# Patient Record
Sex: MALE | Race: WHITE | Employment: FULL TIME | URBAN - METROPOLITAN AREA
[De-identification: names, ages, dates, MRNs, and addresses within clinical notes are randomized per-mention and may not be internally consistent; named-entity substitution may affect disease eponyms.]

---

## 2016-12-31 LAB
ALBUMIN SERPL-MCNC: NORMAL G/DL
ALP BLD-CCNC: NORMAL U/L
ALT SERPL-CCNC: NORMAL U/L
ANION GAP SERPL CALCULATED.3IONS-SCNC: NORMAL MMOL/L
AST SERPL-CCNC: NORMAL U/L
BILIRUB SERPL-MCNC: NORMAL MG/DL (ref 0.1–1.4)
BUN BLDV-MCNC: NORMAL MG/DL
CALCIUM SERPL-MCNC: NORMAL MG/DL
CHLORIDE BLD-SCNC: NORMAL MMOL/L
CHOLESTEROL, TOTAL: 241 MG/DL
CHOLESTEROL, TOTAL: NORMAL MG/DL
CHOLESTEROL/HDL RATIO: NORMAL
CHOLESTEROL/HDL RATIO: NORMAL
CO2: NORMAL MMOL/L
CREAT SERPL-MCNC: NORMAL MG/DL
GFR CALCULATED: NORMAL
GLUCOSE BLD-MCNC: NORMAL MG/DL
HDLC SERPL-MCNC: 63 MG/DL (ref 35–70)
HDLC SERPL-MCNC: NORMAL MG/DL (ref 35–70)
LDL CHOLESTEROL CALCULATED: 151 MG/DL (ref 0–160)
LDL CHOLESTEROL CALCULATED: NORMAL MG/DL (ref 0–160)
POTASSIUM SERPL-SCNC: NORMAL MMOL/L
SODIUM BLD-SCNC: NORMAL MMOL/L
TOTAL PROTEIN: NORMAL
TRIGL SERPL-MCNC: 117 MG/DL
TRIGL SERPL-MCNC: NORMAL MG/DL
VLDLC SERPL CALC-MCNC: NORMAL MG/DL
VLDLC SERPL CALC-MCNC: NORMAL MG/DL

## 2018-04-12 ENCOUNTER — OFFICE VISIT (OUTPATIENT)
Dept: NEUROLOGY | Age: 48
End: 2018-04-12
Payer: COMMERCIAL

## 2018-04-12 VITALS
WEIGHT: 147.9 LBS | SYSTOLIC BLOOD PRESSURE: 107 MMHG | HEIGHT: 68 IN | DIASTOLIC BLOOD PRESSURE: 75 MMHG | OXYGEN SATURATION: 97 % | BODY MASS INDEX: 22.42 KG/M2 | HEART RATE: 83 BPM

## 2018-04-12 DIAGNOSIS — G43.009 MIGRAINE WITHOUT AURA AND WITHOUT STATUS MIGRAINOSUS, NOT INTRACTABLE: Primary | ICD-10-CM

## 2018-04-12 PROCEDURE — 99214 OFFICE O/P EST MOD 30 MIN: CPT | Performed by: CLINICAL NURSE SPECIALIST

## 2018-04-12 RX ORDER — BACLOFEN 10 MG/1
10 TABLET ORAL NIGHTLY
Qty: 30 TABLET | Refills: 1 | Status: SHIPPED | OUTPATIENT
Start: 2018-04-12 | End: 2019-05-30

## 2018-04-12 RX ORDER — CEFUROXIME AXETIL 250 MG/1
TABLET ORAL
COMMUNITY
Start: 2018-02-20 | End: 2018-07-18 | Stop reason: SDUPTHER

## 2018-04-12 RX ORDER — SUMATRIPTAN 100 MG/1
TABLET, FILM COATED ORAL
COMMUNITY
Start: 2018-01-26 | End: 2018-04-12

## 2018-04-12 RX ORDER — IBUPROFEN 200 MG
200 TABLET ORAL
COMMUNITY
Start: 2014-05-06 | End: 2018-07-18

## 2018-05-08 ENCOUNTER — TELEPHONE (OUTPATIENT)
Dept: NEUROLOGY | Age: 48
End: 2018-05-08

## 2018-06-27 DIAGNOSIS — G43.009 MIGRAINE WITHOUT AURA AND WITHOUT STATUS MIGRAINOSUS, NOT INTRACTABLE: ICD-10-CM

## 2018-06-27 RX ORDER — TOPIRAMATE 50 MG/1
50 TABLET, FILM COATED ORAL NIGHTLY
Qty: 30 TABLET | Refills: 5 | Status: SHIPPED | OUTPATIENT
Start: 2018-06-27 | End: 2018-11-30

## 2018-07-17 NOTE — PROGRESS NOTES
Cardiac Electrophysiology Office Progress Note    Alexandria Uglade  1970  Date of Service: 7/18/18   PCP: Yonatan Vega DO  Cardiologist: Paul Groves MD  Neurologist: Kelle Banegas MD; Shashi Platt, BLANE  Electrophysiologist: Carolyn Harris DO    SUBJECTIVE: Alexandria Ugalde is a 50 y.o. male who presents back to the office for management of: Vasodepressor syncope and sinus bradycardia. Since his last Christopher Apple states he feels well. He denies angina, palpitations, PND, orthopnea or significant dizziness. Patient Active Problem List    Diagnosis Date Noted    Near syncope 10/13/2016    Bradycardia, sinus 09/16/2016     Overview Note:     A). Patient reports full work-up at [facility] 2014 for SB and hypotension: reportedly normal  B). Holter monitor 2014: reports normal   C). S/P exercise stress test 7/26/16 [facility] (Dr Case Woodson): SB, appropriate HR and BP response to exercise  D). 2D echo 7/26/16: patient reports normal (report pending)        Hypotension 09/16/2016     Overview Note:     A). Patient reports H/O  B). Full work-up [facility] 2014: patient reports normal  C). No syncope      Migraine 02/28/2013     Overview Note:     A). Topamax prn  B).  Follows with Neurology         Past Medical History:   Diagnosis Date    Bradycardia     Headache(784.0)     Hypotension     Tilt table evaluation 10/13/2016    positve results    Vasodepressor syncope        Past Surgical History:   Procedure Laterality Date    ROTATOR CUFF REPAIR         Family History   Problem Relation Age of Onset    High Cholesterol Mother     Diabetes Father     Heart Attack Maternal Uncle        Social History   Substance Use Topics    Smoking status: Never Smoker    Smokeless tobacco: Current User     Types: Snuff, Chew    Alcohol use No       Current Outpatient Prescriptions   Medication Sig Dispense Refill    topiramate (TOPAMAX) 50 MG tablet Take 1 tablet by mouth nightly 30 tablet 5    SUMAtriptan (IMITREX) 6 MG/0.5ML SOLN injection Inject 0.5 mLs into the skin once as needed for Migraine 3 vial 5    midodrine (PROAMATINE) 2.5 MG tablet Take 3 tablets by mouth 3 times daily 270 tablet 5    baclofen (LIORESAL) 10 MG tablet Take 1 tablet by mouth nightly (Patient taking differently: No sig reported) 30 tablet 1     No current facility-administered medications for this visit. No Known Allergies    ROS:   HENT: Negative for epistaxis, difficulty swallowing. Eyes: Negative for blurred vision or double vision. Respiratory: Negative for cough, chest tightness, (-) recurrent shortness of breath. Cardiovascular: Negative for chest pain, palpitations and leg swelling. Gastrointestinal: Negative for abdominal pain, heartburn, or blood in stool. Genitourinary: Negative for hematuria, burning or frequency. Musculoskeletal: Negative for myalgias, stiffness, or swelling. Skin: Negative for rash, pain, or lumps. Neurological: (-) for near syncope, headaches  and dizziness, (-) seizures. Some lightheadedness- improved  Psychiatric/Behavioral: Negative for confusion and agitation. The patient is not nervous/anxious. PHYSICAL EXAM:  Vitals:    07/18/18 0827   BP: 100/68   Pulse: 53   Resp: 16   Weight: 141 lb 3.2 oz (64 kg)   Height: 5' 8\" (1.727 m)     Constitutional: Oriented to person, place, and time. Well-developed and cooperative. Head: Normocephalic and atraumatic. Eyes: Conjunctivae are normal.    Neck: No hepatojugular reflux and no JVD present. Carotid bruit is not present. Cardiovascular: S1 normal, S2 normal and intact distal pulses. A regular bradycardic rhythm present. PMI is not displaced. Pulmonary/Chest: Effort normal and breath sounds normal. No respiratory distress. Abdominal: Soft. Normal appearance and bowel sounds are normal. No tenderness. Musculoskeletal: Normal range of motion of all extremities, no muscle weakness. Neurological: Alert and oriented to person, place, and time.  Gait normal.                         IVC Expiration: 1.3 cm     Doppler Measurements & Calculations      MV Peak E-Wave:    AV Peak Velocity: 1.05 m/s      LVOT Peak Velocity:   0.75 m/s           AV Peak Gradient: 4.38 mmHg     0.91 m/s   MV Peak A-Wave:    AV Mean Velocity: 0.61 m/s      LVOT Mean Velocity:   0.65 m/s           AV Mean Gradient: 1.9 mmHg      0.54 m/s   MV E/A Ratio: 1.15 AV VTI: 20.1 cm                 LVOT Peak Gradient: 3.3   MV Peak Gradient:  AV Area (Continuity):2.62 cm^2  mmHgLVOT Mean Gradient:   2.2 mmHg                                           1.4 mmHg   MV Mean Gradient:  LVOT VTI: 16.8 cm   1.1 mmHg           IVRT: 76.1 msec   MV Mean Velocity:   0.5 m/s            Pulm. Vein A Reversal   MV Deceleration    Duration:110.7 msec   Time: 169.9 msec   Pulm. Vein D Velocity:0.34      PV Peak Velocity: 0.98   MV P1/2t: 94.4     m/sPulm. Vein A Reversal        m/s   msec               Velocity:0.17 m/s               PV Peak Gradient: 3.84   MVA by PHT:2.33    Pulm. Vein S Velocity: 0.43 m/s mmHg   cm^2                                               PV Mean Velocity: 0.57   MV Area                                            m/s   (continuity): 2.8                                  PV Mean Gradient: 1.6   cm^2                                               mmHg   MV E' Septal   Velocity: 0.12 m/s                                 [place] ED Velocity: 1.23   MV E' Lateral                                      m/s   Velocity: 0.11 m/s         ECG 7/18/18: Sinus bradycardia at 53 BPM, RSR', no acute ST changes. I have independently reviewed all of the ECGs as per above. I have reviewed all progress notes & records from the time of the patient's last office visit up to present. Impression:    1. Vasodepressor syncope  - positive tilt table test  10/13/16  - midodrine 7.5 mg TID 1/19/17  - No recurrent syncope     2.  H/O recurrent lightheaded and dizziness  - symptoms consistent with OH/volume depletion  - drinks

## 2018-07-18 ENCOUNTER — OFFICE VISIT (OUTPATIENT)
Dept: NON INVASIVE DIAGNOSTICS | Age: 48
End: 2018-07-18
Payer: COMMERCIAL

## 2018-07-18 VITALS
HEART RATE: 53 BPM | WEIGHT: 141.2 LBS | BODY MASS INDEX: 21.4 KG/M2 | DIASTOLIC BLOOD PRESSURE: 68 MMHG | HEIGHT: 68 IN | RESPIRATION RATE: 16 BRPM | SYSTOLIC BLOOD PRESSURE: 100 MMHG

## 2018-07-18 DIAGNOSIS — R55 NEAR SYNCOPE: ICD-10-CM

## 2018-07-18 DIAGNOSIS — R00.1 BRADYCARDIA, SINUS: Primary | ICD-10-CM

## 2018-07-18 PROCEDURE — 99213 OFFICE O/P EST LOW 20 MIN: CPT | Performed by: INTERNAL MEDICINE

## 2018-07-18 PROCEDURE — 93000 ELECTROCARDIOGRAM COMPLETE: CPT | Performed by: INTERNAL MEDICINE

## 2018-08-02 ENCOUNTER — OFFICE VISIT (OUTPATIENT)
Dept: NEUROLOGY | Age: 48
End: 2018-08-02
Payer: COMMERCIAL

## 2018-08-02 VITALS
HEART RATE: 65 BPM | DIASTOLIC BLOOD PRESSURE: 76 MMHG | TEMPERATURE: 96.9 F | SYSTOLIC BLOOD PRESSURE: 112 MMHG | OXYGEN SATURATION: 98 % | WEIGHT: 142.6 LBS | HEIGHT: 68 IN | BODY MASS INDEX: 21.61 KG/M2

## 2018-08-02 DIAGNOSIS — G43.009 MIGRAINE WITHOUT AURA AND WITHOUT STATUS MIGRAINOSUS, NOT INTRACTABLE: Primary | ICD-10-CM

## 2018-08-02 PROCEDURE — 99214 OFFICE O/P EST MOD 30 MIN: CPT | Performed by: CLINICAL NURSE SPECIALIST

## 2018-11-21 LAB
AVERAGE GLUCOSE: NORMAL
HBA1C MFR BLD: NORMAL %

## 2018-11-30 ENCOUNTER — OFFICE VISIT (OUTPATIENT)
Dept: NEUROLOGY | Age: 48
End: 2018-11-30
Payer: COMMERCIAL

## 2018-11-30 VITALS
RESPIRATION RATE: 14 BRPM | BODY MASS INDEX: 20.46 KG/M2 | OXYGEN SATURATION: 96 % | SYSTOLIC BLOOD PRESSURE: 114 MMHG | WEIGHT: 135 LBS | DIASTOLIC BLOOD PRESSURE: 78 MMHG | HEIGHT: 68 IN | HEART RATE: 87 BPM | TEMPERATURE: 96.7 F

## 2018-11-30 DIAGNOSIS — G43.709 CHRONIC MIGRAINE WITHOUT AURA WITHOUT STATUS MIGRAINOSUS, NOT INTRACTABLE: Primary | ICD-10-CM

## 2018-11-30 PROCEDURE — 99214 OFFICE O/P EST MOD 30 MIN: CPT | Performed by: CLINICAL NURSE SPECIALIST

## 2018-11-30 RX ORDER — FLUTICASONE PROPIONATE 50 MCG
1 SPRAY, SUSPENSION (ML) NASAL PRN
COMMUNITY
Start: 2018-11-15 | End: 2019-05-30

## 2018-11-30 NOTE — PROGRESS NOTES
Kalani Mcginnis is a 46 y.o. right handed male     Long history of migraines   Follows regularly for his 3-4 migraines per month   Typically takes Imitrex SC and does very well    Unfortunately, he has developed increasing shoulder pains which seem to be triggering more migraines    We tried Baclofen but he did not notice any improvement    He will be following with a DC -- Discussed risks associated with cervical manipulation. verbalized an understanding that I do not recommend cervical manipulation and verbalized an understanding of the risks associated with cervical manipulation. He previously noted he was following with cardiology and EP for lightheadedness   Positive tilt reported but no valvular issues   He started Proamitine and has been doing well with stable BPs and no reports of lightheadedness     [facility] also previously discontinued his Topamax in the past for fear this was previously causing his \"dizziness\"   (prior to his tilt table)   This was restarted because his migraine headaches were worsening and titrated last appt    Previously, he was taking 100mg -- now taking 50mg    We were hesitant to try Trokendi because of this    He is not a candidate for Inderal because of his above stated heart/BP issues    We discussed Aimovig and Ajovy     He responds better with SQ Imitrex than he did with oral abortive therapies    Noting 3 per month usage     No new medical or neuro complaints   Still following with cardiology -- no med changes reported     No chest pain   No SOB  No vertigo, lightheadedness or loss of consciousness  No falls, tripping or stumbling  No incontinence of bowels or bladder  No itching or bruising appreciated  No numbness, tingling or focal arm/leg weakness    ROS otherwise negative    Prior to Visit Medications    Medication Sig Taking?  Authorizing Provider   Erenumab-aooe (AIMOVIG) 70 MG/ML SOAJ Inject 70 mg into the skin every 30 days Yes [facility]   baclofen

## 2018-12-06 ENCOUNTER — TELEPHONE (OUTPATIENT)
Dept: NEUROLOGY | Age: 48
End: 2018-12-06

## 2019-01-18 ENCOUNTER — OFFICE VISIT (OUTPATIENT)
Dept: NON INVASIVE DIAGNOSTICS | Age: 49
End: 2019-01-18
Payer: COMMERCIAL

## 2019-01-18 VITALS
RESPIRATION RATE: 16 BRPM | HEIGHT: 68 IN | BODY MASS INDEX: 22.43 KG/M2 | HEART RATE: 64 BPM | SYSTOLIC BLOOD PRESSURE: 100 MMHG | DIASTOLIC BLOOD PRESSURE: 60 MMHG | WEIGHT: 148 LBS

## 2019-01-18 DIAGNOSIS — R55 NEAR SYNCOPE: ICD-10-CM

## 2019-01-18 DIAGNOSIS — R00.1 BRADYCARDIA, SINUS: Primary | ICD-10-CM

## 2019-01-18 PROCEDURE — 99213 OFFICE O/P EST LOW 20 MIN: CPT | Performed by: NURSE PRACTITIONER

## 2019-01-18 PROCEDURE — 93000 ELECTROCARDIOGRAM COMPLETE: CPT | Performed by: INTERNAL MEDICINE

## 2019-01-18 ASSESSMENT — ENCOUNTER SYMPTOMS
SHORTNESS OF BREATH: 0
CHEST TIGHTNESS: 0

## 2019-02-04 DIAGNOSIS — G43.709 CHRONIC MIGRAINE WITHOUT AURA WITHOUT STATUS MIGRAINOSUS, NOT INTRACTABLE: ICD-10-CM

## 2019-03-01 ENCOUNTER — OFFICE VISIT (OUTPATIENT)
Dept: NEUROLOGY | Age: 49
End: 2019-03-01
Payer: COMMERCIAL

## 2019-03-01 VITALS
RESPIRATION RATE: 18 BRPM | TEMPERATURE: 96.9 F | BODY MASS INDEX: 21.98 KG/M2 | WEIGHT: 145 LBS | HEIGHT: 68 IN | DIASTOLIC BLOOD PRESSURE: 75 MMHG | OXYGEN SATURATION: 97 % | SYSTOLIC BLOOD PRESSURE: 118 MMHG | HEART RATE: 87 BPM

## 2019-03-01 DIAGNOSIS — G43.709 CHRONIC MIGRAINE WITHOUT AURA WITHOUT STATUS MIGRAINOSUS, NOT INTRACTABLE: Primary | ICD-10-CM

## 2019-03-01 PROCEDURE — 99214 OFFICE O/P EST MOD 30 MIN: CPT | Performed by: CLINICAL NURSE SPECIALIST

## 2019-04-09 DIAGNOSIS — G43.709 CHRONIC MIGRAINE WITHOUT AURA WITHOUT STATUS MIGRAINOSUS, NOT INTRACTABLE: ICD-10-CM

## 2019-05-30 ENCOUNTER — OFFICE VISIT (OUTPATIENT)
Dept: FAMILY MEDICINE CLINIC | Age: 49
End: 2019-05-30
Payer: COMMERCIAL

## 2019-05-30 VITALS
HEART RATE: 80 BPM | OXYGEN SATURATION: 99 % | WEIGHT: 141.38 LBS | SYSTOLIC BLOOD PRESSURE: 110 MMHG | HEIGHT: 68 IN | BODY MASS INDEX: 21.43 KG/M2 | TEMPERATURE: 98.3 F | DIASTOLIC BLOOD PRESSURE: 70 MMHG

## 2019-05-30 DIAGNOSIS — E78.2 MIXED HYPERLIPIDEMIA: Primary | ICD-10-CM

## 2019-05-30 DIAGNOSIS — J30.1 SEASONAL ALLERGIC RHINITIS DUE TO POLLEN: ICD-10-CM

## 2019-05-30 DIAGNOSIS — G43.101 MIGRAINE WITH AURA AND WITH STATUS MIGRAINOSUS, NOT INTRACTABLE: ICD-10-CM

## 2019-05-30 DIAGNOSIS — Z72.0 TOBACCO ABUSE: ICD-10-CM

## 2019-05-30 DIAGNOSIS — R73.01 IMPAIRED FASTING BLOOD SUGAR: ICD-10-CM

## 2019-05-30 PROCEDURE — 99214 OFFICE O/P EST MOD 30 MIN: CPT | Performed by: FAMILY MEDICINE

## 2019-05-30 RX ORDER — FLUTICASONE PROPIONATE 50 MCG
1 SPRAY, SUSPENSION (ML) NASAL PRN
Qty: 16 G | Refills: 5 | Status: SHIPPED | OUTPATIENT
Start: 2019-05-30 | End: 2020-01-07 | Stop reason: ALTCHOICE

## 2019-05-30 SDOH — HEALTH STABILITY: MENTAL HEALTH: HOW MANY STANDARD DRINKS CONTAINING ALCOHOL DO YOU HAVE ON A TYPICAL DAY?: 1 OR 2

## 2019-05-30 SDOH — HEALTH STABILITY: MENTAL HEALTH: HOW OFTEN DO YOU HAVE A DRINK CONTAINING ALCOHOL?: MONTHLY OR LESS

## 2019-05-30 ASSESSMENT — PATIENT HEALTH QUESTIONNAIRE - PHQ9
SUM OF ALL RESPONSES TO PHQ QUESTIONS 1-9: 0
SUM OF ALL RESPONSES TO PHQ9 QUESTIONS 1 & 2: 0
1. LITTLE INTEREST OR PLEASURE IN DOING THINGS: 0
SUM OF ALL RESPONSES TO PHQ QUESTIONS 1-9: 0
2. FEELING DOWN, DEPRESSED OR HOPELESS: 0

## 2019-05-30 ASSESSMENT — ENCOUNTER SYMPTOMS
WHEEZING: 0
RHINORRHEA: 1
VOMITING: 0
ABDOMINAL PAIN: 0
CONSTIPATION: 0
COUGH: 0
BACK PAIN: 0
NAUSEA: 0
SHORTNESS OF BREATH: 0
DIARRHEA: 0
SORE THROAT: 1
SINUS PAIN: 0
EYE PAIN: 0

## 2019-05-30 NOTE — PROGRESS NOTES
5/30/19    Name: Atilio Santana  QYP:[birth year]   Sex:male   Age:48 y.o. Chief Complaint   Patient presents with    Allergic Rhinitis      chronic    Other     migraine     Patient pressent for follow up, no labs done yet    Still allergy problems but nottaking any meds for it either        Review of Systems   Constitutional: Negative for appetite change, fatigue and fever. HENT: Positive for postnasal drip, rhinorrhea, sneezing and sore throat. Negative for congestion, ear pain, hearing loss and sinus pain. Eyes: Negative for pain. Respiratory: Negative for cough, shortness of breath and wheezing. Cardiovascular: Negative for chest pain and leg swelling. Gastrointestinal: Negative for abdominal pain, constipation, diarrhea, nausea and vomiting. Endocrine: Negative for cold intolerance and heat intolerance. Genitourinary: Negative for difficulty urinating, hematuria, scrotal swelling, testicular pain and urgency. Musculoskeletal: Negative for arthralgias, back pain, joint swelling and myalgias. Skin: Negative for rash and wound. Neurological: Negative for dizziness, syncope and light-headedness. Hematological: Negative for adenopathy. Psychiatric/Behavioral: Negative for confusion and sleep disturbance. The patient is not nervous/anxious.             Current Outpatient Medications:     fluticasone (FLONASE) 50 MCG/ACT nasal spray, 1 spray by Nasal route as needed for Rhinitis or Allergies, Disp: 16 g, Rfl: 5    Erenumab-aooe (AIMOVIG) 70 MG/ML SOAJ, Inject 70 mg into the skin every 30 days, Disp: 1 pen, Rfl: 5    SUMAtriptan (IMITREX) 6 MG/0.5ML SOLN injection, Inject 0.5 mLs into the skin once as needed for Migraine, Disp: 3 vial, Rfl: 5  No Known Allergies   Past Medical History:   Diagnosis Date    Allergic rhinitis     Bradycardia     Headache(784.0)     Hypotension     Migraine with aura and with status migrainosus     Substance abuse (HCC)     chews tobacco 1 can per 2 days  Tilt table evaluation 10/13/2016    positve results    Tobacco abuse     Vasodepressor syncope      Patient Active Problem List    Diagnosis Date Noted    Mixed hyperlipidemia 05/30/2019    Impaired fasting blood sugar 05/30/2019    Tobacco abuse     Migraine with aura and with status migrainosus     Near syncope 10/13/2016    Bradycardia, sinus 09/16/2016    Hypotension 09/16/2016    Migraine 02/28/2013      Past Surgical History:   Procedure Laterality Date    ROTATOR CUFF REPAIR      SHOULDER ARTHROSCOPY Bilateral       Social History     Tobacco History     Smoking Status  Never Smoker    Smokeless Tobacco Use  Current User Smokeless Tobacco Type  Chew, Snuff    Tobacco Comment  1 can /2 days          Alcohol History     Alcohol Use Status  Yes Drinks/Week  0 Standard drinks or equivalent per week Amount  0.0 oz alcohol/wk          Drug Use     Drug Use Status  No          Sexual Activity     Sexually Active  Not Asked                  /70   Pulse 80   Temp 98.3 °F (36.8 °C)   Ht 5' 8\" (1.727 m)   Wt 141 lb 6 oz (64.1 kg)   SpO2 99%   BMI 21.50 kg/m²     EXAM:   Physical Exam   Constitutional: He is oriented to person, place, and time. He appears well-developed and well-nourished. HENT:   Head: Normocephalic and atraumatic. Eyes: Pupils are equal, round, and reactive to light. EOM are normal.   Neck: Normal range of motion. Cardiovascular: Normal rate and regular rhythm. Pulmonary/Chest: Effort normal and breath sounds normal.   Musculoskeletal:   Left knee pain, tore meniscus, seeing ortho andhe is delaying surgery for now   Neurological: He is alert and oriented to person, place, and time. Skin: Skin is warm and dry. Nursing note and vitals reviewed. Sandra Kay was seen today for allergic rhinitis  and other. Diagnoses and all orders for this visit:    Mixed hyperlipidemia  -     CBC Auto Differential; Future  -     Comprehensive Metabolic Panel;  Future  -     Lipid Panel; Future    Tobacco abuse    Migraine with aura and with status migrainosus, not intractable    Impaired fasting blood sugar  -     Comprehensive Metabolic Panel; Future  -     Hemoglobin A1C; Future    Seasonal allergic rhinitis due to pollen  -     fluticasone (FLONASE) 50 MCG/ACT nasal spray; 1 spray by Nasal route as needed for Rhinitis or Allergies    add claritin  [facility]  F/uin 6 months    Seen by:   Octavio Coates DO

## 2019-06-19 ENCOUNTER — TELEPHONE (OUTPATIENT)
Dept: NEUROLOGY | Age: 49
End: 2019-06-19

## 2019-07-16 ASSESSMENT — ENCOUNTER SYMPTOMS
CHEST TIGHTNESS: 0
SHORTNESS OF BREATH: 0

## 2019-07-18 ENCOUNTER — OFFICE VISIT (OUTPATIENT)
Dept: NEUROLOGY | Age: 49
End: 2019-07-18
Payer: COMMERCIAL

## 2019-07-18 VITALS
OXYGEN SATURATION: 97 % | DIASTOLIC BLOOD PRESSURE: 75 MMHG | HEART RATE: 73 BPM | WEIGHT: 141.3 LBS | HEIGHT: 68 IN | SYSTOLIC BLOOD PRESSURE: 112 MMHG | RESPIRATION RATE: 20 BRPM | BODY MASS INDEX: 21.41 KG/M2

## 2019-07-18 DIAGNOSIS — G43.709 CHRONIC MIGRAINE WITHOUT AURA WITHOUT STATUS MIGRAINOSUS, NOT INTRACTABLE: Primary | ICD-10-CM

## 2019-07-18 PROCEDURE — 99214 OFFICE O/P EST MOD 30 MIN: CPT | Performed by: CLINICAL NURSE SPECIALIST

## 2019-07-18 NOTE — PROGRESS NOTES
atraumatic  Neck: no adenopathy, no carotid bruit  Lungs: clear to auscultation bilaterally  Heart: regular rate and rhythm, S1, S2 normal, no murmur, click, rub or gallop  Extremities: no clubbing, no cyanosis or edema  Pulses: 2+ and symmetric  Skin: No rashes or lesions      Grimaces to palpation of right trapezius muscle     Mental Status: Alert, oriented, thought content appropriate    Speech: clear  Language: normal    Cranial Nerves:  I: smell    II: visual acuity     II: visual fields Full   II: pupils SIVA   III,VII: ptosis    III,IV,VI: extraocular muscles  Full ROM   V: mastication Normal   V: facial light touch sensation  Normal   V,VII: corneal reflex  Present   VII: facial muscle function - upper     VII: facial muscle function - lower Normal   VIII: hearing Normal   IX: soft palate elevation  Normal   IX,X: gag reflex Present   XI: trapezius strength  5/5   XI: sternocleidomastoid strength 5/5   XI: neck extension strength  5/5   XII: tongue strength  Normal     Motor:  5/5 throughout  Decreased bulk throughout  Normal tone    Sensory:  LT and PP normal  Vibration normal    Coordination:   FN, FFM and CHUCKY normal    Gait:  antalgic    DTR:   1+ throughout    Laboratory/Radiology:     [facility] at [facility]   None to personally review at this time     Echocardiogram unremarkable   No valve issues reported     Assessment:     Migraines   Markedly approved with Aimovig     Limited medication options d/t previous medical conditions      Previously failed Topamax, Imitrex pill, Axert pill, Relpax pill, Zomig nasal spray, Onzetra nasal spray and muscle relaxants such as Baclofen      He is not a candidate for Inderal or other beta blockers d/t cardiac/BP issues     Not a candidate for Trokendi d/t previous side effects with higher doses of Topamax     Orthostatic hypotension - resolved with ProAmatine    Being monitored by cardiology    Plan:     continue Aimovig and Imitrex prn    RTO in fall     Call with

## 2019-07-23 ENCOUNTER — OFFICE VISIT (OUTPATIENT)
Dept: NON INVASIVE DIAGNOSTICS | Age: 49
End: 2019-07-23
Payer: COMMERCIAL

## 2019-07-23 VITALS
DIASTOLIC BLOOD PRESSURE: 70 MMHG | HEIGHT: 68 IN | WEIGHT: 146 LBS | SYSTOLIC BLOOD PRESSURE: 102 MMHG | HEART RATE: 61 BPM | BODY MASS INDEX: 22.13 KG/M2 | RESPIRATION RATE: 18 BRPM

## 2019-07-23 DIAGNOSIS — R00.1 BRADYCARDIA, SINUS: Primary | ICD-10-CM

## 2019-07-23 PROCEDURE — 93000 ELECTROCARDIOGRAM COMPLETE: CPT | Performed by: INTERNAL MEDICINE

## 2019-07-23 PROCEDURE — 99213 OFFICE O/P EST LOW 20 MIN: CPT | Performed by: INTERNAL MEDICINE

## 2019-11-11 ENCOUNTER — OFFICE VISIT (OUTPATIENT)
Dept: FAMILY MEDICINE CLINIC | Age: 49
End: 2019-11-11
Payer: COMMERCIAL

## 2019-11-11 ENCOUNTER — HOSPITAL ENCOUNTER (OUTPATIENT)
Age: 49
Discharge: HOME | End: 2019-11-13
Payer: COMMERCIAL

## 2019-11-11 VITALS
DIASTOLIC BLOOD PRESSURE: 68 MMHG | RESPIRATION RATE: 16 BRPM | SYSTOLIC BLOOD PRESSURE: 120 MMHG | OXYGEN SATURATION: 98 % | TEMPERATURE: 97.9 F | WEIGHT: 137 LBS | BODY MASS INDEX: 20.83 KG/M2 | HEART RATE: 71 BPM

## 2019-11-11 DIAGNOSIS — K62.5 RECTAL BLEEDING: Primary | ICD-10-CM

## 2019-11-11 DIAGNOSIS — K62.5 RECTAL BLEEDING: ICD-10-CM

## 2019-11-11 LAB
BASOPHILS ABSOLUTE: 0.07 E9/L (ref 0–0.2)
BASOPHILS RELATIVE PERCENT: 1.1 % (ref 0–2)
EOSINOPHILS ABSOLUTE: 0.06 E9/L (ref 0.05–0.5)
EOSINOPHILS RELATIVE PERCENT: 1 % (ref 0–6)
HCT VFR BLD CALC: 45.4 % (ref 37–54)
HEMOGLOBIN: 14.8 G/DL (ref 12.5–16.5)
IMMATURE GRANULOCYTES #: 0.01 E9/L
IMMATURE GRANULOCYTES %: 0.2 % (ref 0–5)
LYMPHOCYTES ABSOLUTE: 1.24 E9/L (ref 1.5–4)
LYMPHOCYTES RELATIVE PERCENT: 20.3 % (ref 20–42)
MCH RBC QN AUTO: 29.4 PG (ref 26–35)
MCHC RBC AUTO-ENTMCNC: 32.6 % (ref 32–34.5)
MCV RBC AUTO: 90.3 FL (ref 80–99.9)
MONOCYTES ABSOLUTE: 0.51 E9/L (ref 0.1–0.95)
MONOCYTES RELATIVE PERCENT: 8.3 % (ref 2–12)
NEUTROPHILS ABSOLUTE: 4.22 E9/L (ref 1.8–7.3)
NEUTROPHILS RELATIVE PERCENT: 69.1 % (ref 43–80)
PDW BLD-RTO: 12.1 FL (ref 11.5–15)
PLATELET # BLD: 252 E9/L (ref 130–450)
PMV BLD AUTO: 11.3 FL (ref 7–12)
RBC # BLD: 5.03 E12/L (ref 3.8–5.8)
WBC # BLD: 6.1 E9/L (ref 4.5–11.5)

## 2019-11-11 PROCEDURE — 85025 COMPLETE CBC W/AUTO DIFF WBC: CPT

## 2019-11-11 PROCEDURE — 99213 OFFICE O/P EST LOW 20 MIN: CPT | Performed by: FAMILY MEDICINE

## 2019-11-11 PROCEDURE — 36415 COLL VENOUS BLD VENIPUNCTURE: CPT

## 2019-11-11 RX ORDER — HYDROCORTISONE ACETATE 25 MG/1
SUPPOSITORY RECTAL
Qty: 5 SUPPOSITORY | Refills: 1 | Status: SHIPPED | OUTPATIENT
Start: 2019-11-11 | End: 2019-11-19 | Stop reason: SDUPTHER

## 2019-11-11 ASSESSMENT — ENCOUNTER SYMPTOMS
RESPIRATORY NEGATIVE: 1
BLOOD IN STOOL: 1
CONSTIPATION: 0
RECTAL PAIN: 0
ANAL BLEEDING: 1
ALLERGIC/IMMUNOLOGIC NEGATIVE: 1

## 2019-11-12 ENCOUNTER — TELEPHONE (OUTPATIENT)
Dept: FAMILY MEDICINE CLINIC | Age: 49
End: 2019-11-12

## 2019-11-19 ENCOUNTER — OFFICE VISIT (OUTPATIENT)
Dept: FAMILY MEDICINE CLINIC | Age: 49
End: 2019-11-19
Payer: COMMERCIAL

## 2019-11-19 VITALS
OXYGEN SATURATION: 97 % | SYSTOLIC BLOOD PRESSURE: 110 MMHG | HEART RATE: 78 BPM | BODY MASS INDEX: 21.14 KG/M2 | WEIGHT: 139.5 LBS | HEIGHT: 68 IN | DIASTOLIC BLOOD PRESSURE: 60 MMHG | TEMPERATURE: 98.4 F

## 2019-11-19 DIAGNOSIS — G43.709 CHRONIC MIGRAINE WITHOUT AURA WITHOUT STATUS MIGRAINOSUS, NOT INTRACTABLE: ICD-10-CM

## 2019-11-19 DIAGNOSIS — Z12.5 ENCOUNTER FOR SCREENING FOR MALIGNANT NEOPLASM OF PROSTATE: ICD-10-CM

## 2019-11-19 DIAGNOSIS — Z00.01 ENCOUNTER FOR WELL ADULT EXAM WITH ABNORMAL FINDINGS: Primary | ICD-10-CM

## 2019-11-19 DIAGNOSIS — E07.9 THYROID DISEASE: ICD-10-CM

## 2019-11-19 DIAGNOSIS — E78.2 MIXED HYPERLIPIDEMIA: ICD-10-CM

## 2019-11-19 DIAGNOSIS — K64.9 HEMORRHOIDS, UNSPECIFIED HEMORRHOID TYPE: ICD-10-CM

## 2019-11-19 DIAGNOSIS — J30.9 ALLERGIC RHINITIS, UNSPECIFIED SEASONALITY, UNSPECIFIED TRIGGER: ICD-10-CM

## 2019-11-19 PROCEDURE — 99396 PREV VISIT EST AGE 40-64: CPT | Performed by: FAMILY MEDICINE

## 2019-11-19 RX ORDER — AZELASTINE 1 MG/ML
1 SPRAY, METERED NASAL 2 TIMES DAILY
Qty: 1 BOTTLE | Refills: 5 | Status: SHIPPED | OUTPATIENT
Start: 2019-11-19 | End: 2020-01-07 | Stop reason: ALTCHOICE

## 2019-11-19 RX ORDER — HYDROCORTISONE ACETATE 25 MG/1
SUPPOSITORY RECTAL
Qty: 30 SUPPOSITORY | Refills: 2 | Status: SHIPPED | OUTPATIENT
Start: 2019-11-19 | End: 2020-01-07 | Stop reason: ALTCHOICE

## 2019-11-19 ASSESSMENT — ENCOUNTER SYMPTOMS
WHEEZING: 0
DIARRHEA: 0
NAUSEA: 0
SHORTNESS OF BREATH: 0
SINUS PAIN: 0
RECTAL PAIN: 1
CHEST TIGHTNESS: 0
RHINORRHEA: 1
BACK PAIN: 0
SORE THROAT: 0
CONSTIPATION: 0
VOMITING: 0
COUGH: 0
ABDOMINAL PAIN: 0
EYE PAIN: 0

## 2019-11-26 ENCOUNTER — OFFICE VISIT (OUTPATIENT)
Dept: NEUROLOGY | Age: 49
End: 2019-11-26
Payer: COMMERCIAL

## 2019-11-26 VITALS
HEIGHT: 68 IN | WEIGHT: 139.8 LBS | SYSTOLIC BLOOD PRESSURE: 104 MMHG | HEART RATE: 74 BPM | DIASTOLIC BLOOD PRESSURE: 80 MMHG | BODY MASS INDEX: 21.19 KG/M2 | RESPIRATION RATE: 20 BRPM

## 2019-11-26 DIAGNOSIS — G43.709 CHRONIC MIGRAINE WITHOUT AURA WITHOUT STATUS MIGRAINOSUS, NOT INTRACTABLE: Primary | ICD-10-CM

## 2019-11-26 DIAGNOSIS — G43.009 MIGRAINE WITHOUT AURA AND WITHOUT STATUS MIGRAINOSUS, NOT INTRACTABLE: ICD-10-CM

## 2019-11-26 PROCEDURE — 99214 OFFICE O/P EST MOD 30 MIN: CPT | Performed by: CLINICAL NURSE SPECIALIST

## 2019-11-26 RX ORDER — SUMATRIPTAN 6 MG/.5ML
6 INJECTION, SOLUTION SUBCUTANEOUS
Qty: 3 VIAL | Refills: 5 | Status: SHIPPED | OUTPATIENT
Start: 2019-11-26 | End: 2020-01-07 | Stop reason: ALTCHOICE

## 2019-12-10 DIAGNOSIS — G43.009 MIGRAINE WITHOUT AURA AND WITHOUT STATUS MIGRAINOSUS, NOT INTRACTABLE: ICD-10-CM

## 2019-12-10 DIAGNOSIS — G43.709 CHRONIC MIGRAINE WITHOUT AURA WITHOUT STATUS MIGRAINOSUS, NOT INTRACTABLE: ICD-10-CM

## 2019-12-11 ENCOUNTER — TELEPHONE (OUTPATIENT)
Dept: NEUROLOGY | Age: 49
End: 2019-12-11

## 2020-01-07 NOTE — PROGRESS NOTES
Alexandra PRE-ADMISSION TESTING INSTRUCTIONS    The Preadmission Testing patient is instructed accordingly using the following criteria (check applicable):    ARRIVAL INSTRUCTIONS:  [x] Parking the day of Surgery is located in the Main Entrance lot. Upon entering the door, make an immediate right to the surgery reception desk    [] [phone] is available Monday through Friday 6 am to 6 pm    [x] Bring photo ID and insurance card    [] Bring in a copy of Living will or Durable Power of  papers. [x] Please be sure to arrange for responsible adult to provide transportation to and from the hospital    [x] Please arrange for responsible adult to be with you for the 24 hour period post procedure due to having anesthesia      GENERAL INSTRUCTIONS:    [x] Nothing by mouth after midnight, including gum, candy, mints or water    [x] You may brush your teeth, but do not swallow any water    [] Take medications as instructed with 1-2 oz of water    [] Stop herbal supplements and vitamins 5 days prior to procedure    [] Follow preop dosing of blood thinners per physician instructions    [] Take 1/2 dose of evening insulin, but no insulin after midnight    [] No oral diabetic medications after midnight    [] If diabetic and have low blood sugar or feel symptomatic, take 1-2oz apple juice only    [] Bring inhalers day of surgery    [] Bring C-PAP/ Bi-Pap day of surgery    [] Bring urine specimen day of surgery    [x] Shower or bath with soap, lather and rinse well, AM of Surgery, no lotion, powders or creams to surgical site    [] Follow bowel prep as instructed per surgeon    [x] No tobacco products within 24 hours of surgery     [x] No alcohol or illegal drug use within 24 hours of surgery.     [x] Jewelry, body piercing's, eyeglasses, contact lenses and dentures are not permitted into surgery (bring cases)      [] Please do not wear any nail polish, make up or hair

## 2020-01-08 RX ORDER — SUMATRIPTAN 6 MG/.5ML
6 INJECTION, SOLUTION SUBCUTANEOUS
Qty: 3 VIAL | Refills: 5 | Status: SHIPPED
Start: 2020-01-08 | End: 2020-07-13 | Stop reason: SDUPTHER

## 2020-01-14 ENCOUNTER — ANESTHESIA EVENT (OUTPATIENT)
Dept: OPERATING ROOM | Age: 50
End: 2020-01-14
Payer: COMMERCIAL

## 2020-01-15 ENCOUNTER — HOSPITAL ENCOUNTER (OUTPATIENT)
Age: 50
Setting detail: OUTPATIENT SURGERY
Discharge: HOME | End: 2020-01-15
Attending: ORTHOPAEDIC SURGERY | Admitting: ORTHOPAEDIC SURGERY
Payer: COMMERCIAL

## 2020-01-15 ENCOUNTER — ANESTHESIA (OUTPATIENT)
Dept: OPERATING ROOM | Age: 50
End: 2020-01-15
Payer: COMMERCIAL

## 2020-01-15 VITALS
BODY MASS INDEX: 21.07 KG/M2 | OXYGEN SATURATION: 98 % | HEART RATE: 60 BPM | TEMPERATURE: 97 F | SYSTOLIC BLOOD PRESSURE: 113 MMHG | RESPIRATION RATE: 14 BRPM | HEIGHT: 68 IN | DIASTOLIC BLOOD PRESSURE: 68 MMHG | WEIGHT: 139 LBS

## 2020-01-15 VITALS
DIASTOLIC BLOOD PRESSURE: 74 MMHG | TEMPERATURE: 95.9 F | OXYGEN SATURATION: 99 % | RESPIRATION RATE: 2 BRPM | SYSTOLIC BLOOD PRESSURE: 112 MMHG

## 2020-01-15 LAB — METER GLUCOSE: 94 MG/DL (ref 74–99)

## 2020-01-15 PROCEDURE — 2580000003 HC RX 258: Performed by: NURSE ANESTHETIST, CERTIFIED REGISTERED

## 2020-01-15 PROCEDURE — 7100000001 HC PACU RECOVERY - ADDTL 15 MIN: Performed by: ORTHOPAEDIC SURGERY

## 2020-01-15 PROCEDURE — 3600000013 HC SURGERY LEVEL 3 ADDTL 15MIN: Performed by: ORTHOPAEDIC SURGERY

## 2020-01-15 PROCEDURE — 2500000003 HC RX 250 WO HCPCS: Performed by: ORTHOPAEDIC SURGERY

## 2020-01-15 PROCEDURE — 3700000000 HC ANESTHESIA ATTENDED CARE: Performed by: ORTHOPAEDIC SURGERY

## 2020-01-15 PROCEDURE — 3700000001 HC ADD 15 MINUTES (ANESTHESIA): Performed by: ORTHOPAEDIC SURGERY

## 2020-01-15 PROCEDURE — 3600000003 HC SURGERY LEVEL 3 BASE: Performed by: ORTHOPAEDIC SURGERY

## 2020-01-15 PROCEDURE — 7100000011 HC PHASE II RECOVERY - ADDTL 15 MIN: Performed by: ORTHOPAEDIC SURGERY

## 2020-01-15 PROCEDURE — 7100000000 HC PACU RECOVERY - FIRST 15 MIN: Performed by: ORTHOPAEDIC SURGERY

## 2020-01-15 PROCEDURE — 6370000000 HC RX 637 (ALT 250 FOR IP)

## 2020-01-15 PROCEDURE — 82962 GLUCOSE BLOOD TEST: CPT

## 2020-01-15 PROCEDURE — 6360000002 HC RX W HCPCS: Performed by: NURSE ANESTHETIST, CERTIFIED REGISTERED

## 2020-01-15 PROCEDURE — 2500000003 HC RX 250 WO HCPCS: Performed by: NURSE ANESTHETIST, CERTIFIED REGISTERED

## 2020-01-15 PROCEDURE — 2709999900 HC NON-CHARGEABLE SUPPLY: Performed by: ORTHOPAEDIC SURGERY

## 2020-01-15 PROCEDURE — 6360000002 HC RX W HCPCS: Performed by: ORTHOPAEDIC SURGERY

## 2020-01-15 PROCEDURE — 7100000010 HC PHASE II RECOVERY - FIRST 15 MIN: Performed by: ORTHOPAEDIC SURGERY

## 2020-01-15 RX ORDER — SODIUM CHLORIDE 0.9 % (FLUSH) 0.9 %
10 SYRINGE (ML) INJECTION PRN
Status: DISCONTINUED | OUTPATIENT
Start: 2020-01-15 | End: 2020-01-15 | Stop reason: HOSPADM

## 2020-01-15 RX ORDER — HYDROCODONE BITARTRATE AND ACETAMINOPHEN 5; 325 MG/1; MG/1
1 TABLET ORAL
Status: COMPLETED | OUTPATIENT
Start: 2020-01-15 | End: 2020-01-15

## 2020-01-15 RX ORDER — PROPOFOL 10 MG/ML
INJECTION, EMULSION INTRAVENOUS PRN
Status: DISCONTINUED | OUTPATIENT
Start: 2020-01-15 | End: 2020-01-15 | Stop reason: SDUPTHER

## 2020-01-15 RX ORDER — FENTANYL CITRATE 50 UG/ML
INJECTION, SOLUTION INTRAMUSCULAR; INTRAVENOUS PRN
Status: DISCONTINUED | OUTPATIENT
Start: 2020-01-15 | End: 2020-01-15 | Stop reason: SDUPTHER

## 2020-01-15 RX ORDER — CEFAZOLIN SODIUM 2 G/50ML
2 SOLUTION INTRAVENOUS
Status: COMPLETED | OUTPATIENT
Start: 2020-01-15 | End: 2020-01-15

## 2020-01-15 RX ORDER — BUPIVACAINE HYDROCHLORIDE 2.5 MG/ML
INJECTION, SOLUTION EPIDURAL; INFILTRATION; INTRACAUDAL PRN
Status: DISCONTINUED | OUTPATIENT
Start: 2020-01-15 | End: 2020-01-15 | Stop reason: ALTCHOICE

## 2020-01-15 RX ORDER — MIDAZOLAM HYDROCHLORIDE 1 MG/ML
INJECTION INTRAMUSCULAR; INTRAVENOUS PRN
Status: DISCONTINUED | OUTPATIENT
Start: 2020-01-15 | End: 2020-01-15 | Stop reason: SDUPTHER

## 2020-01-15 RX ORDER — FENTANYL CITRATE 50 UG/ML
25 INJECTION, SOLUTION INTRAMUSCULAR; INTRAVENOUS EVERY 5 MIN PRN
Status: DISCONTINUED | OUTPATIENT
Start: 2020-01-15 | End: 2020-01-15 | Stop reason: HOSPADM

## 2020-01-15 RX ORDER — SODIUM CHLORIDE 9 MG/ML
INJECTION, SOLUTION INTRAVENOUS CONTINUOUS PRN
Status: DISCONTINUED | OUTPATIENT
Start: 2020-01-15 | End: 2020-01-15 | Stop reason: SDUPTHER

## 2020-01-15 RX ORDER — DEXAMETHASONE SODIUM PHOSPHATE 4 MG/ML
INJECTION, SOLUTION INTRA-ARTICULAR; INTRALESIONAL; INTRAMUSCULAR; INTRAVENOUS; SOFT TISSUE PRN
Status: DISCONTINUED | OUTPATIENT
Start: 2020-01-15 | End: 2020-01-15 | Stop reason: SDUPTHER

## 2020-01-15 RX ORDER — LIDOCAINE HYDROCHLORIDE 20 MG/ML
INJECTION, SOLUTION EPIDURAL; INFILTRATION; INTRACAUDAL; PERINEURAL PRN
Status: DISCONTINUED | OUTPATIENT
Start: 2020-01-15 | End: 2020-01-15 | Stop reason: SDUPTHER

## 2020-01-15 RX ORDER — HYDROCODONE BITARTRATE AND ACETAMINOPHEN 5; 325 MG/1; MG/1
TABLET ORAL
Status: COMPLETED
Start: 2020-01-15 | End: 2020-01-15

## 2020-01-15 RX ORDER — LIDOCAINE HYDROCHLORIDE AND EPINEPHRINE 10; 10 MG/ML; UG/ML
INJECTION, SOLUTION INFILTRATION; PERINEURAL PRN
Status: DISCONTINUED | OUTPATIENT
Start: 2020-01-15 | End: 2020-01-15 | Stop reason: ALTCHOICE

## 2020-01-15 RX ORDER — HYDROCODONE BITARTRATE AND ACETAMINOPHEN 5; 325 MG/1; MG/1
1 TABLET ORAL EVERY 6 HOURS PRN
Qty: 28 TABLET | Refills: 0 | Status: SHIPPED | OUTPATIENT
Start: 2020-01-15 | End: 2020-01-22

## 2020-01-15 RX ORDER — SODIUM CHLORIDE 0.9 % (FLUSH) 0.9 %
10 SYRINGE (ML) INJECTION EVERY 12 HOURS SCHEDULED
Status: DISCONTINUED | OUTPATIENT
Start: 2020-01-15 | End: 2020-01-15 | Stop reason: HOSPADM

## 2020-01-15 RX ORDER — ONDANSETRON 2 MG/ML
INJECTION INTRAMUSCULAR; INTRAVENOUS PRN
Status: DISCONTINUED | OUTPATIENT
Start: 2020-01-15 | End: 2020-01-15 | Stop reason: SDUPTHER

## 2020-01-15 RX ADMIN — HYDROCODONE BITARTRATE AND ACETAMINOPHEN 1 TABLET: 5; 325 TABLET ORAL at 11:58

## 2020-01-15 RX ADMIN — DEXAMETHASONE SODIUM PHOSPHATE 10 MG: 4 INJECTION, SOLUTION INTRAMUSCULAR; INTRAVENOUS at 10:08

## 2020-01-15 RX ADMIN — LIDOCAINE HYDROCHLORIDE 50 MG: 20 INJECTION, SOLUTION EPIDURAL; INFILTRATION; INTRACAUDAL; PERINEURAL at 10:00

## 2020-01-15 RX ADMIN — PROPOFOL 125 MG: 10 INJECTION, EMULSION INTRAVENOUS at 10:00

## 2020-01-15 RX ADMIN — SODIUM CHLORIDE: 9 INJECTION, SOLUTION INTRAVENOUS at 09:56

## 2020-01-15 RX ADMIN — MIDAZOLAM 2 MG: 1 INJECTION INTRAMUSCULAR; INTRAVENOUS at 09:56

## 2020-01-15 RX ADMIN — FENTANYL CITRATE 100 MCG: 50 INJECTION, SOLUTION INTRAMUSCULAR; INTRAVENOUS at 10:00

## 2020-01-15 RX ADMIN — CEFAZOLIN SODIUM 2 G: 2 SOLUTION INTRAVENOUS at 09:56

## 2020-01-15 RX ADMIN — ONDANSETRON HYDROCHLORIDE 4 MG: 2 INJECTION, SOLUTION INTRAMUSCULAR; INTRAVENOUS at 10:08

## 2020-01-15 ASSESSMENT — LIFESTYLE VARIABLES: SMOKING_STATUS: 1

## 2020-01-15 ASSESSMENT — PULMONARY FUNCTION TESTS
PIF_VALUE: 10
PIF_VALUE: 23
PIF_VALUE: 10
PIF_VALUE: 1
PIF_VALUE: 10
PIF_VALUE: 25
PIF_VALUE: 15
PIF_VALUE: 10
PIF_VALUE: 4
PIF_VALUE: 10
PIF_VALUE: 26
PIF_VALUE: 11
PIF_VALUE: 10
PIF_VALUE: 21
PIF_VALUE: 10
PIF_VALUE: 1
PIF_VALUE: 11
PIF_VALUE: 1
PIF_VALUE: 10
PIF_VALUE: 1
PIF_VALUE: 10
PIF_VALUE: 10
PIF_VALUE: 6
PIF_VALUE: 10
PIF_VALUE: 21
PIF_VALUE: 10
PIF_VALUE: 10
PIF_VALUE: 6
PIF_VALUE: 10
PIF_VALUE: 10
PIF_VALUE: 3
PIF_VALUE: 10
PIF_VALUE: 10

## 2020-01-15 ASSESSMENT — PAIN DESCRIPTION - DESCRIPTORS: DESCRIPTORS: ACHING

## 2020-01-15 ASSESSMENT — PAIN DESCRIPTION - LOCATION: LOCATION: KNEE

## 2020-01-15 ASSESSMENT — PAIN - FUNCTIONAL ASSESSMENT: PAIN_FUNCTIONAL_ASSESSMENT: 0-10

## 2020-01-15 ASSESSMENT — PAIN DESCRIPTION - PAIN TYPE: TYPE: SURGICAL PAIN

## 2020-01-15 ASSESSMENT — PAIN SCALES - GENERAL
PAINLEVEL_OUTOF10: 0
PAINLEVEL_OUTOF10: 5
PAINLEVEL_OUTOF10: 5
PAINLEVEL_OUTOF10: 0

## 2020-01-15 ASSESSMENT — PAIN DESCRIPTION - ORIENTATION: ORIENTATION: LEFT

## 2020-01-15 NOTE — PROGRESS NOTES
Discharge instructions gone over with patient, medications reviewed with patient. All questions answered, patient verbalized understanding.

## 2020-01-15 NOTE — INTERVAL H&P NOTE
H&P Update    H&P reviewed. No changes.     Electronically signed by Natalee Florez MD on 1/15/20 at 9:52 AM

## 2020-01-15 NOTE — OP NOTE
the 15 blade through the skin, subcutaneous tissue and through the capsule. The knee was extended while the arthroscopic obturator was placed through the lateral portal. This was carried up underneath the patient patella. The camera was then placed and correct joint placement was confirmed. The joint was filled with sterile normal saline. Inspection of the patella was carried out. The scope was then brought down to the lateral gutter and then into the medial gutter. No loose bodies were identified. It was then brought into the medial compartment. A medial portal was established. There was noted to be a complex tear of the posterior horn and body of the medial meniscus. The was debrided with an upbiter and smoothed out with a shaver. He did have some unstable cartilage that was delaminating. This was debrided with a shaver. At this point, the scope was brought across the central portion of the joint. The ACL was identified. This was noted to be intact. Scope was then moved to the lateral compartment. It was found to be in good condition. The camera was then brought back to the patellofemoral joint. Documentation was made throughout the arthroscopic procedure using the camera. Copious amounts of irrigation were then run through the patients joint, again making sure not to leave behind any loose bodies. The arthroscopic instruments were removed. The arthroscopic portals were closed with 3-0 prolene sutures in a figure of 8 fashion. The portal sites were infiltrated with 0.5 % marcaine. A sterile dressing was then applied. There were no complications and the patient tolerated the procedure well. The patient was taken to the recovery room in stable condition. Postoperative: The patient can be weight bearing as tolerated with crutches. The dressing can be removed in 2 days. A prescription will be given for pain medications and for  mg daily for DVT prophylaxis.   The patient should follow up in the office in 14 days.     Leandro Thomas MD

## 2020-01-15 NOTE — ANESTHESIA PRE PROCEDURE
(Chews tobacco)                           Cardiovascular:          ECG reviewed  Rhythm: regular  Rate: normal  Echocardiogram reviewed  Stress test reviewed             ROS comment: Hx of hypotension and bradycardia. Neuro/Psych:   (+) headaches: migraine headaches,             GI/Hepatic/Renal: Neg GI/Hepatic/Renal ROS            Endo/Other:                      ROS comment: Impaired fasting glucose Abdominal:           Vascular: negative vascular ROS. Anesthesia Plan      general     ASA 2     (Pt agrees to General--IV induction.)  Induction: intravenous. Anesthetic plan and risks discussed with patient. Plan discussed with CRNA.     Attending anesthesiologist reviewed and agrees with Barbara Elizalde M.D.

## 2020-01-15 NOTE — H&P
History and Physical  K. Perry Estrada MD        Chief Complaint     Patient is here to discuss and schedule surgery. Patient rates the pain 4/10. last xray 4/26/19        Pain: medial knee  Symptoms: swelling, instability, night pain  Previous Treatment: cortisone injection, NSAIDS, physical therapy, home exercise program  Additional Comments: Abhilash Manuel returns to the office for follow up. He was recently approved for surgery by [employer]. He has continued to have sharp pain that he localizes to his medial knee. Currently pain is 4/10. Pain is worse with activity, walking, stairs and pain improves with rest. He takes tylenol and motrin as needed for pain. Physical Exam   General Appearance:   Awake, alert, oriented x3  Well developed, well nourished  No acute distress  Eyes appear Normal    Respiratory:       Respiratory effort: non-labored    Cardiovascular:   Edema: absent      Varicosities: absent    Lymphatic/Skin:       Skin Appearance: Normal              Left Knee Exam   Skin Exam:   skin intact  Alignment:   neutral  ROM:   0-125  Tenderness:   medial joint line  Arian:   with pain  Laxity with varus/valgus stress:   <5 degrees  Gait:   antalgic  Comments:   Sensation intact to light touch L1-S1  TA/EHL/GS intact  Palpable DP, W/WP  Calf soft, non tender       Past Medical History - reviewed  No pertinent past medical history.     Family History - reviewed  Diabetes (father, mother, sister, paternal grandmother)  Cancer (father)  Heart disease (maternal grandmother, maternal grandfather)    Surgical History - reviewed  Rotator cuff repair (bilateral)    Social History - reviewed  Hand dominance: right  Occupation:     Risk Factors - reviewed  Patient had a flu shot in the last 12 months? no  The patient is 72 years or older and has had a pneumonia vaccine: n/a  Patient has an implant none  Tobacco status: never smoker  Alcohol use: non drinker  Does patient exercise? yes  How often does patient exercise? daily  In the past 12 months, have you fallen? yes          Current Medications (including meds started today):   SUMATRIPTAN SUCCINATE 50 MG ORAL TABLET (SUMATRIPTAN SUCCINATE) ; Route: ORAL  AIMOVIG 70 MG/ML SUBCUTANEOUS SOLUTION AUTO-INJECTOR ([place] Backbone) ; Route: SUBCUTANEOUS      Current Allergies (reviewed this update):  No known allergies      Vital Signs   Weight: 138.01 lbs. (62.60 kg.)  Height: 68 in.    (172.72 cm.)  Blood Pressure: 101/63 mm Hg  Body Mass Index: 20.98  Body Surface Area: 1.75 m2      Review of Systems   General: Positive for fatigue. Eyes:  Patient denies eye irritation, vision loss - 1 eye, discharge, blurring, vision loss - both eyes. ENT:  Patient denies decreased hearing, difficulty swallowing. Cardiovascular: Positive for shortness of breath with exertion. Respiratory:  Patient denies cough, coughing up blood, chest discomfort, wheezing, shortness of breath. Gastrointestinal: Positive for loss of appetite. Genitourinary:  Patient denies urinary retention, urinary frequency, frequent UTI, urinary urgency, pain. Musculoskeletal: Positive for back pain, joint pain. Skin:  Patient denies dryness, unusual hair distribution, suspicious lesions, psoriasis, changes in color of skin, changes in nail beds, poor wound healing. Neurologic: Positive for headaches. Psychiatric:  Patient denies anxiety, depression. Heme/Lymphatic:  Patient denies abnormal bruising. Allergic/Immunologic:  Patient denies seasonal allergies, persistent infections. Impression   1. Complex tear of medial meniscus, current injury, left knee, initial encounter: Unchanged  2. Sprain of unspecified site of left knee, initial encounter: Unchanged    Plan of Care:   I had a long discussion regarding meniscus tears. Operative and nonoperative treatment was discussed. MRI shows a tear of the medial meniscus.   Nonoperative treatment was failed and pain has become a

## 2020-02-26 ENCOUNTER — TELEPHONE (OUTPATIENT)
Dept: NEUROLOGY | Age: 50
End: 2020-02-26

## 2020-05-11 ENCOUNTER — OFFICE VISIT (OUTPATIENT)
Dept: FAMILY MEDICINE CLINIC | Age: 50
End: 2020-05-11
Payer: COMMERCIAL

## 2020-05-11 VITALS
OXYGEN SATURATION: 98 % | BODY MASS INDEX: 21.44 KG/M2 | HEIGHT: 68 IN | TEMPERATURE: 98.2 F | HEART RATE: 66 BPM | SYSTOLIC BLOOD PRESSURE: 108 MMHG | DIASTOLIC BLOOD PRESSURE: 60 MMHG | WEIGHT: 141.5 LBS

## 2020-05-11 PROCEDURE — 99214 OFFICE O/P EST MOD 30 MIN: CPT | Performed by: FAMILY MEDICINE

## 2020-05-11 ASSESSMENT — ENCOUNTER SYMPTOMS
NAUSEA: 0
SHORTNESS OF BREATH: 0
CHEST TIGHTNESS: 0
ABDOMINAL PAIN: 0
WHEEZING: 0
SORE THROAT: 0
SINUS PAIN: 0
BACK PAIN: 0
EYE PAIN: 0
CONSTIPATION: 0
DIARRHEA: 0
VOMITING: 0
COUGH: 0

## 2020-05-11 ASSESSMENT — PATIENT HEALTH QUESTIONNAIRE - PHQ9
1. LITTLE INTEREST OR PLEASURE IN DOING THINGS: 0
2. FEELING DOWN, DEPRESSED OR HOPELESS: 0
SUM OF ALL RESPONSES TO PHQ QUESTIONS 1-9: 0
SUM OF ALL RESPONSES TO PHQ9 QUESTIONS 1 & 2: 0
SUM OF ALL RESPONSES TO PHQ QUESTIONS 1-9: 0

## 2020-05-11 NOTE — PROGRESS NOTES
20    Name: Mónica Santana  :1970   Sex:male   Age:49 y.o. Chief Complaint   Patient presents with    Migraine    Allergic Rhinitis      Patient presents to office for follow up. He denies any complaints. Patient had left knee arthroscopy done, today was his first day back to work. Patient says that his knee is doing fine so far. He denies any issues currently. He did not get labs done. Patient here for check up  Was to have labs done but has not gotten them done yet    Finally had arthroscopic surgery of left knee  Workers comp injury  He continues to see ortho b/c he thinks he will need a full knee replaement  Done with therapy and dooing a little better but still not full strength as before injury    Hx migraines   Stable  Seeing neurology    Hx hyperlipidemia  Needs labs done    Refuses colonoscopy        Review of Systems   Constitutional: Negative for appetite change, fatigue and fever. HENT: Negative for congestion, ear pain, hearing loss, sinus pain and sore throat. Eyes: Negative for pain. Respiratory: Negative for cough, chest tightness, shortness of breath and wheezing. Cardiovascular: Negative for chest pain and leg swelling. Gastrointestinal: Negative for abdominal pain, constipation, diarrhea, nausea and vomiting. Endocrine: Negative for cold intolerance and heat intolerance. Genitourinary: Negative for difficulty urinating, dysuria, frequency, hematuria, scrotal swelling, testicular pain and urgency. Musculoskeletal: Negative for arthralgias, back pain, gait problem, joint swelling and myalgias. Skin: Negative for rash and wound. Neurological: Negative for dizziness, syncope, light-headedness and headaches. Hematological: Negative for adenopathy. Psychiatric/Behavioral: Negative for confusion, dysphoric mood, self-injury, sleep disturbance and suicidal ideas. The patient is not nervous/anxious.             Current Outpatient Medications:     SUMAtriptan (IMITREX) 6 MG/0.5ML SOLN injection, Inject 0.5 mLs into the skin once as needed for Migraine, Disp: 3 vial, Rfl: 5    Erenumab-aooe (AIMOVIG) 70 MG/ML SOAJ, Inject 70 mg into the skin every 30 days, Disp: 1 pen, Rfl: 5  No Known Allergies   Past Medical History:   Diagnosis Date    Acute medial meniscus tear, left, initial encounter     Allergic rhinitis     Headache(784.0)     Migraine with aura and with status migrainosus     Tobacco abuse      Patient Active Problem List    Diagnosis Date Noted    Allergic rhinitis 11/19/2019    Hemorrhoids 11/19/2019    Mixed hyperlipidemia 05/30/2019    Impaired fasting blood sugar 05/30/2019    Tobacco abuse     Migraine with aura and with status migrainosus     Migraine 02/28/2013      Past Surgical History:   Procedure Laterality Date    COLONOSCOPY      KNEE ARTHROSCOPY Left 1/15/2020    LEFT KNEE ARTHROSCOPY WITH PARTIAL MENISCECTOMY performed by Elysia Barrios MD at [address] [place] [address] ARTHROSCOPY Bilateral     WISDOM TOOTH EXTRACTION        Social History     Tobacco History     Smoking Status  Never Smoker    Smokeless Tobacco Use  Current User Smokeless Tobacco Type  Chew, Snuff    Tobacco Comment  1 can /2 days          Alcohol History     Alcohol Use Status  Not Currently Drinks/Week  0 Standard drinks or equivalent per week Amount  0.0 standard drinks of alcohol/wk          Drug Use     Drug Use Status  No          Sexual Activity     Sexually Active  Not Currently            /60   Pulse 66   Temp 98.2 °F (36.8 °C)   Ht 5' 8\" (1.727 m)   Wt 141 lb 8 oz (64.2 kg)   SpO2 98%   BMI 21.52 kg/m²     EXAM:   Physical Exam  Vitals signs and nursing note reviewed. Constitutional:       Appearance: Normal appearance. He is well-developed. HENT:      Head: Normocephalic and atraumatic.       Right Ear: Tympanic membrane normal.      Left Ear: Tympanic membrane normal.      Nose: Nose normal.      Mouth/Throat:

## 2020-07-01 RX ORDER — ERENUMAB-AOOE 70 MG/ML
70 INJECTION SUBCUTANEOUS
Qty: 1 PEN | Refills: 5 | Status: SHIPPED
Start: 2020-07-01 | End: 2020-08-27 | Stop reason: SDUPTHER

## 2020-07-13 ENCOUNTER — OFFICE VISIT (OUTPATIENT)
Dept: NEUROLOGY | Age: 50
End: 2020-07-13
Payer: COMMERCIAL

## 2020-07-13 VITALS
HEIGHT: 68 IN | BODY MASS INDEX: 21.37 KG/M2 | SYSTOLIC BLOOD PRESSURE: 103 MMHG | TEMPERATURE: 98.4 F | RESPIRATION RATE: 20 BRPM | HEART RATE: 84 BPM | WEIGHT: 141 LBS | OXYGEN SATURATION: 95 % | DIASTOLIC BLOOD PRESSURE: 64 MMHG

## 2020-07-13 PROCEDURE — 99214 OFFICE O/P EST MOD 30 MIN: CPT | Performed by: CLINICAL NURSE SPECIALIST

## 2020-07-13 RX ORDER — SUMATRIPTAN 6 MG/.5ML
6 INJECTION, SOLUTION SUBCUTANEOUS
Qty: 3 VIAL | Refills: 5 | Status: SHIPPED
Start: 2020-07-13 | End: 2021-03-22

## 2020-07-13 NOTE — TELEPHONE ENCOUNTER
Forwarding to LISETTE Salcedo, since Kevyn Davila DNP, is out of office.   Electronically signed by Geovanny Bird on 7/1/20 at 8:31 AM EDT
Fibroid, uterine

## 2020-07-13 NOTE — PROGRESS NOTES
Amilcar Campos is a 44 y.o. right handed male     Long history of migraines   He was following regularly for his 3-4 migraines per month   Typically took Imitrex SC for these spells    [facility] also previously discontinued his Topamax in the past for fear this was previously causing his \"dizziness\"   (prior to his tilt table)   This was restarted because his migraine headaches were worsening and titrated last appt    Previously, he was taking 100mg -- now taking 50mg    We were hesitant to try Trokendi because of this    we started Aimovig and he has done well   States he has not suffered a migraine since its initiation    Now taking 70mg     He responds better with SQ Imitrex than he did with oral abortive therapies    He has only needed a few in last 6 months     No new neuro complaints     Still following with cardiology -- no med changes reported     No chest pain   No SOB  No vertigo, lightheadedness or loss of consciousness  No falls, tripping or stumbling  No incontinence of bowels or bladder  No itching or bruising appreciated    ROS otherwise negative    Prior to Visit Medications    Medication Sig Taking?  Authorizing Provider   SUMAtriptan (IMITREX) 6 MG/0.5ML SOLN injection Inject 0.5 mLs into the skin once as needed for Migraine Yes LISETTE Dumont - CNS   Erenumab-aooe (AIMOVIG) 70 MG/ML SOAJ Inject 70 mg into the skin every 30 days Yes LISETTE Garcia - CNP     Allergies as of 07/13/2020    (No Known Allergies)       Objective:     /64 (Site: Left Upper Arm, Position: Sitting, Cuff Size: Medium Adult)   Pulse 84   Temp 98.4 °F (36.9 °C) (Oral)   Resp 20   Ht 5' 8\" (1.727 m)   Wt 141 lb (64 kg)   SpO2 95%   BMI 21.44 kg/m²   Afebrile      General appearance: alert, appears stated age and cooperative -- thin  Head: Normocephalic, without obvious abnormality, atraumatic  Neck: no adenopathy, no carotid bruit  Extremities: no clubbing, no cyanosis or edema  Pulses: 2+ and

## 2020-08-07 ENCOUNTER — HOSPITAL ENCOUNTER (EMERGENCY)
Age: 50
Discharge: HOME | End: 2020-08-07
Payer: COMMERCIAL

## 2020-08-07 VITALS
OXYGEN SATURATION: 95 % | SYSTOLIC BLOOD PRESSURE: 106 MMHG | HEIGHT: 68 IN | WEIGHT: 135 LBS | TEMPERATURE: 97.7 F | BODY MASS INDEX: 20.46 KG/M2 | RESPIRATION RATE: 18 BRPM | HEART RATE: 52 BPM | DIASTOLIC BLOOD PRESSURE: 73 MMHG

## 2020-08-07 PROCEDURE — 99284 EMERGENCY DEPT VISIT MOD MDM: CPT

## 2020-08-07 PROCEDURE — 6370000000 HC RX 637 (ALT 250 FOR IP): Performed by: PHYSICIAN ASSISTANT

## 2020-08-07 PROCEDURE — 6360000002 HC RX W HCPCS: Performed by: PHYSICIAN ASSISTANT

## 2020-08-07 PROCEDURE — 96372 THER/PROPH/DIAG INJ SC/IM: CPT

## 2020-08-07 RX ORDER — HYDROXYZINE HYDROCHLORIDE 50 MG/ML
25 INJECTION, SOLUTION INTRAMUSCULAR ONCE
Status: COMPLETED | OUTPATIENT
Start: 2020-08-07 | End: 2020-08-07

## 2020-08-07 RX ORDER — FAMOTIDINE 20 MG/1
20 TABLET, FILM COATED ORAL ONCE
Status: COMPLETED | OUTPATIENT
Start: 2020-08-07 | End: 2020-08-07

## 2020-08-07 RX ORDER — DIPHENHYDRAMINE HCL 25 MG
50 TABLET ORAL ONCE
Status: COMPLETED | OUTPATIENT
Start: 2020-08-07 | End: 2020-08-07

## 2020-08-07 RX ORDER — DEXAMETHASONE SODIUM PHOSPHATE 10 MG/ML
8 INJECTION INTRAMUSCULAR; INTRAVENOUS ONCE
Status: COMPLETED | OUTPATIENT
Start: 2020-08-07 | End: 2020-08-07

## 2020-08-07 RX ORDER — PREDNISONE 10 MG/1
40 TABLET ORAL DAILY
Qty: 20 TABLET | Refills: 0 | Status: SHIPPED | OUTPATIENT
Start: 2020-08-07 | End: 2020-08-12

## 2020-08-07 RX ORDER — FAMOTIDINE 20 MG/1
20 TABLET, FILM COATED ORAL 2 TIMES DAILY
Qty: 14 TABLET | Refills: 0 | Status: SHIPPED | OUTPATIENT
Start: 2020-08-07 | End: 2021-03-22

## 2020-08-07 RX ORDER — HYDROCODONE BITARTRATE AND ACETAMINOPHEN 5; 325 MG/1; MG/1
1 TABLET ORAL ONCE
Status: COMPLETED | OUTPATIENT
Start: 2020-08-07 | End: 2020-08-07

## 2020-08-07 RX ORDER — HYDROXYZINE PAMOATE 25 MG/1
25 CAPSULE ORAL 3 TIMES DAILY PRN
Qty: 21 CAPSULE | Refills: 0 | Status: SHIPPED | OUTPATIENT
Start: 2020-08-07 | End: 2020-08-14

## 2020-08-07 RX ADMIN — DEXAMETHASONE SODIUM PHOSPHATE 8 MG: 10 INJECTION INTRAMUSCULAR; INTRAVENOUS at 02:42

## 2020-08-07 RX ADMIN — HYDROXYZINE HYDROCHLORIDE 25 MG: 50 INJECTION, SOLUTION INTRAMUSCULAR at 03:08

## 2020-08-07 RX ADMIN — FAMOTIDINE 20 MG: 20 TABLET ORAL at 02:41

## 2020-08-07 RX ADMIN — DIPHENHYDRAMINE HCL 50 MG: 25 TABLET ORAL at 02:41

## 2020-08-07 RX ADMIN — HYDROCODONE BITARTRATE AND ACETAMINOPHEN 1 TABLET: 5; 325 TABLET ORAL at 03:09

## 2020-08-07 ASSESSMENT — PAIN DESCRIPTION - DESCRIPTORS: DESCRIPTORS: BURNING;OTHER (COMMENT)

## 2020-08-07 ASSESSMENT — PAIN DESCRIPTION - LOCATION: LOCATION: GENERALIZED

## 2020-08-07 ASSESSMENT — PAIN DESCRIPTION - PAIN TYPE: TYPE: ACUTE PAIN

## 2020-08-07 ASSESSMENT — PAIN SCALES - GENERAL
PAINLEVEL_OUTOF10: 10
PAINLEVEL_OUTOF10: 10

## 2020-08-07 ASSESSMENT — PAIN DESCRIPTION - ONSET: ONSET: ON-GOING

## 2020-08-07 NOTE — ED PROVIDER NOTES
------------------------- NURSING NOTES AND VITALS REVIEWED ---------------------------   The nursing notes within the ED encounter and vital signs as below have been reviewed. /87   Pulse 84   Temp 97.7 °F (36.5 °C) (Oral)   Resp 16   Ht 5' 8\" (1.727 m)   Wt 135 lb (61.2 kg)   SpO2 98%   BMI 20.53 kg/m²   Oxygen Saturation Interpretation: Normal      ---------------------------------------------------PHYSICAL EXAM--------------------------------------      Constitutional/General: Alert and oriented x3, well appearing, non toxic in NAD  Head: Normocephalic and atraumatic mild swelling of the right ear noted  Eyes: PERRL, EOMI  Mouth: Oropharynx clear, handling secretions, no trismus  Neck: Supple, full ROM,   Pulmonary: Lungs clear to auscultation bilaterally, no wheezes, rales, or rhonchi. Not in respiratory distress  Cardiovascular:  Regular rate and rhythm, no murmurs, gallops, or rubs. 2+ distal pulses  Abdomen: Soft, non tender, non distended,   Extremities: Moves all extremities x 4. Warm and well perfused erythema of the left arm present. No rash noted. No swelling noted. Skin: warm and dry without rash  Neurologic: GCS 15,  Psych anxious affect      ------------------------------ ED COURSE/MEDICAL DECISION MAKING----------------------  Medications   dexamethasone (DECADRON) injection 8 mg (8 mg Intramuscular Given 8/7/20 0242)   diphenhydrAMINE (BENADRYL) tablet 50 mg (50 mg Oral Given 8/7/20 0241)   famotidine (PEPCID) tablet 20 mg (20 mg Oral Given 8/7/20 0241)   HYDROcodone-acetaminophen (NORCO) 5-325 MG per tablet 1 tablet (1 tablet Oral Given 8/7/20 0309)   hydrOXYzine (VISTARIL) injection 25 mg (25 mg Intramuscular Given 8/7/20 0308)         ED COURSE:   Patient stating now he is having pain radiating down the arm to the hand. He denies any neck pain. Pruritus persistent at the elbow and lower arm.     Medical Decision Making:    Patient came in with complaint of pruritus of the left arm and the pain. He had been stung  By a bee, 2 days previously. No difficulty swallowing shortness of breath no sign of anaphylaxis. Patient had an improvement in his pruritus after Vistaril. States the pain of the arm is more superficial likely secondary to scratching of the skin. He was discharged with prednisone burst Pepcid Vistaril he is to follow-up with his primary care 1 to 2 days. Counseling: The emergency provider has spoken with the patient and discussed todays results, in addition to providing specific details for the plan of care and counseling regarding the diagnosis and prognosis. Questions are answered at this time and they are agreeable with the plan.      --------------------------------- IMPRESSION AND DISPOSITION ---------------------------------    IMPRESSION  1. Insect bite, nonvenomous of face, neck, and scalp except eye, initial encounter    2. Pruritic disorder        DISPOSITION  Disposition: Discharge to home  Patient condition is good      NOTE: This report was transcribed using voice recognition software.  Every effort was made to ensure accuracy; however, inadvertent computerized transcription errors may be present     Mikaela Dunawayma  08/07/20 [id number]

## 2020-08-07 NOTE — ED NOTES
Asking for more meds for itch  [facility] notified     Tino Rodriguez RN  08/07/20 [id number]

## 2020-08-27 RX ORDER — ERENUMAB-AOOE 70 MG/ML
70 INJECTION SUBCUTANEOUS
Qty: 3 PEN | Refills: 3 | Status: SHIPPED
Start: 2020-08-27 | End: 2021-07-07

## 2020-11-05 ENCOUNTER — TELEPHONE (OUTPATIENT)
Dept: NEUROLOGY | Age: 50
End: 2020-11-05

## 2020-11-05 NOTE — TELEPHONE ENCOUNTER
PERRY to set up Jan/Feb in [employer] w/Chris Parker, [address]  Electronically signed by Lian Mooney on 11/5/20 at 2:36 PM EST

## 2020-11-10 ENCOUNTER — TELEPHONE (OUTPATIENT)
Dept: NEUROLOGY | Age: 50
End: 2020-11-10

## 2020-11-10 NOTE — TELEPHONE ENCOUNTER
Letter to patient to set up Jan 2021 w/Chris Cardenas in [facility]  Electronically signed by Ana Luisa Garcia on 11/10/20 at 3:03 PM EST

## 2020-11-23 ENCOUNTER — TELEPHONE (OUTPATIENT)
Dept: NEUROLOGY | Age: 50
End: 2020-11-23

## 2021-01-12 ENCOUNTER — TELEPHONE (OUTPATIENT)
Dept: NEUROLOGY | Age: 51
End: 2021-01-12

## 2021-01-12 NOTE — TELEPHONE ENCOUNTER
Aimovig Approval - #[id number],  Valid 1/11/2021 - 7/10/2021.   In Media  Electronically signed by Manisha Spears on 1/12/21 at 10:19 AM EST

## 2021-03-22 ENCOUNTER — OFFICE VISIT (OUTPATIENT)
Dept: NEUROLOGY | Age: 51
End: 2021-03-22

## 2021-03-22 VITALS
HEART RATE: 72 BPM | BODY MASS INDEX: 20.46 KG/M2 | RESPIRATION RATE: 20 BRPM | HEIGHT: 68 IN | DIASTOLIC BLOOD PRESSURE: 76 MMHG | TEMPERATURE: 98 F | SYSTOLIC BLOOD PRESSURE: 113 MMHG | OXYGEN SATURATION: 96 % | WEIGHT: 135 LBS

## 2021-03-22 DIAGNOSIS — G43.009 MIGRAINE WITHOUT AURA AND WITHOUT STATUS MIGRAINOSUS, NOT INTRACTABLE: ICD-10-CM

## 2021-03-22 DIAGNOSIS — G43.709 CHRONIC MIGRAINE WITHOUT AURA WITHOUT STATUS MIGRAINOSUS, NOT INTRACTABLE: Primary | ICD-10-CM

## 2021-03-22 PROCEDURE — 99214 OFFICE O/P EST MOD 30 MIN: CPT | Performed by: CLINICAL NURSE SPECIALIST

## 2021-03-22 RX ORDER — SUMATRIPTAN 6 MG/.5ML
6 INJECTION, SOLUTION SUBCUTANEOUS
Qty: 3 VIAL | Refills: 5 | Status: SHIPPED
Start: 2021-03-22 | End: 2021-09-13 | Stop reason: SDUPTHER

## 2021-03-22 RX ORDER — SUMATRIPTAN SUCCINATE 6 MG/.5ML
INJECTION, SOLUTION SUBCUTANEOUS
COMMUNITY
Start: 2021-02-28 | End: 2021-09-13

## 2021-03-22 NOTE — PROGRESS NOTES
Hilda Araujo is a 44 y.o. right handed male     Long history of migraines   He was following regularly for his 3-4 migraines per month   Typically took Imitrex SC for these spells    [facility] also previously discontinued his Topamax in the past for fear this was previously causing his \"dizziness\"   (prior to his tilt table)   This was restarted because his migraine headaches were worsening and titrated last appt    Previously, he was taking 100mg -- now taking 50mg    We were hesitant to try Trokendi because of this    we started Aimovig and did well   Now taking 70mg but he did have a 5 month period where his insurance told him \"he used up his allowance\"    He never notified my office but states he got it March 1 and is doing well again     He responds better with SQ Imitrex than he did with oral abortive therapies    Never tried Gabriela Michelle or Nurtec     No new neuro complaints     Still following with cardiology -- no med changes reported     No chest pain   No SOB  No vertigo, lightheadedness or loss of consciousness  No falls, tripping or stumbling  No incontinence of bowels or bladder  No itching or bruising appreciated    ROS otherwise negative    Prior to Visit Medications    Medication Sig Taking?  Authorizing Provider   SUMAtriptan Succinate Refill 6 MG/0.5ML SOCT  Yes Historical Provider, MD   SUMAtriptan (IMITREX) 6 MG/0.5ML SOLN injection Inject 0.5 mLs into the skin once as needed for Migraine Yes LISETTE Fernandez   Erenumab-aooe (AIMOVIG) 70 MG/ML SOAJ Inject 70 mg into the skin every 30 days Yes LISETTE Fernandez   famotidine (PEPCID) 20 MG tablet Take 1 tablet by mouth 2 times daily for 7 days Yes SYBIL Reid     Allergies as of 03/22/2021    (No Known Allergies)       Objective:     /76 (Site: Left Upper Arm, Position: Sitting, Cuff Size: Medium Adult)   Pulse 72   Temp 98 °F (36.7 °C)   Resp 20   Ht 5' 8\" (1.727 m)   Wt 135 lb (61.2 kg)   SpO2 96%   BMI 20.53 kg/m²   Afebrile      General appearance: alert, appears stated age and cooperative -- thin  Head: Normocephalic, without obvious abnormality, atraumatic  Neck: no adenopathy, no carotid bruit  Extremities: no clubbing, no cyanosis or edema  Pulses: 2+ and symmetric  Skin: No rashes or lesions      Grimaces to palpation of right trapezius muscle     Mental Status: Alert, oriented, thought content appropriate    Speech: clear  Language: normal    Cranial Nerves:  I: smell    II: visual acuity     II: visual fields Full   II: pupils SIVA   III,VII: ptosis    III,IV,VI: extraocular muscles  Full ROM   V: mastication Normal   V: facial light touch sensation  Normal   V,VII: corneal reflex  Present   VII: facial muscle function - upper     VII: facial muscle function - lower Normal   VIII: hearing Normal   IX: soft palate elevation  Normal   IX,X: gag reflex    XI: trapezius strength  5/5   XI: sternocleidomastoid strength 5/5   XI: neck extension strength  5/5   XII: tongue strength  Normal     Motor:  5/5 throughout  Decreased bulk throughout  Normal tone    Sensory:  LT normal  Vibration normal    Coordination:   FN, FFM and CHUCKY normal    Gait:  antalgic    DTR:   1+ throughout    Laboratory/Radiology:     Recent labs at [facility]   None to personally review at this time     Echocardiogram unremarkable   No valve issues reported     Assessment:     Migraines   Markedly approved with Aimovig     Limited medication options d/t previous medical conditions      Previously failed Topamax, Imitrex pill, Axert pill, Relpax pill, Zomig nasal spray, Onzetra nasal spray and muscle relaxants such as Baclofen      He is not a candidate for Inderal or other beta blockers d/t cardiac/BP issues    Orthostatic hypotension - resolved with ProAmatine    Being monitored by cardiology    Plan:     continue Aimovig and Imitrex prn   Will try Ubrelvy in hopes of no longer using triptan given his heart hx     RTO in summer     Call with continued issues     Seymouria Gum  3:39 PM  3/22/2021

## 2021-06-21 ENCOUNTER — TELEPHONE (OUTPATIENT)
Dept: NEUROLOGY | Age: 51
End: 2021-06-21

## 2021-07-06 DIAGNOSIS — G43.709 CHRONIC MIGRAINE WITHOUT AURA WITHOUT STATUS MIGRAINOSUS, NOT INTRACTABLE: ICD-10-CM

## 2021-07-07 RX ORDER — ERENUMAB-AOOE 70 MG/ML
70 INJECTION SUBCUTANEOUS
Qty: 1 ML | Refills: 5 | Status: SHIPPED
Start: 2021-07-07 | End: 2021-09-13 | Stop reason: SDUPTHER

## 2021-09-13 ENCOUNTER — OFFICE VISIT (OUTPATIENT)
Dept: NEUROLOGY | Age: 51
End: 2021-09-13
Payer: COMMERCIAL

## 2021-09-13 VITALS
WEIGHT: 135 LBS | BODY MASS INDEX: 20.46 KG/M2 | HEIGHT: 68 IN | RESPIRATION RATE: 20 BRPM | OXYGEN SATURATION: 97 % | SYSTOLIC BLOOD PRESSURE: 106 MMHG | DIASTOLIC BLOOD PRESSURE: 71 MMHG | HEART RATE: 72 BPM | TEMPERATURE: 98.4 F

## 2021-09-13 DIAGNOSIS — G43.709 CHRONIC MIGRAINE WITHOUT AURA WITHOUT STATUS MIGRAINOSUS, NOT INTRACTABLE: ICD-10-CM

## 2021-09-13 DIAGNOSIS — G43.009 MIGRAINE WITHOUT AURA AND WITHOUT STATUS MIGRAINOSUS, NOT INTRACTABLE: ICD-10-CM

## 2021-09-13 PROCEDURE — 99214 OFFICE O/P EST MOD 30 MIN: CPT | Performed by: CLINICAL NURSE SPECIALIST

## 2021-09-13 RX ORDER — ERENUMAB-AOOE 70 MG/ML
70 INJECTION SUBCUTANEOUS
Qty: 1 ML | Refills: 5 | Status: SHIPPED
Start: 2021-09-13 | End: 2021-12-10

## 2021-09-13 RX ORDER — SUMATRIPTAN 6 MG/.5ML
6 INJECTION, SOLUTION SUBCUTANEOUS
Qty: 3 EACH | Refills: 3 | Status: SHIPPED
Start: 2021-09-13 | End: 2022-08-26 | Stop reason: SDUPTHER

## 2021-09-13 RX ORDER — SUMATRIPTAN 6 MG/.5ML
6 INJECTION, SOLUTION SUBCUTANEOUS
Qty: 3 EACH | Refills: 3 | Status: SHIPPED
Start: 2021-09-13 | End: 2021-09-13 | Stop reason: SDUPTHER

## 2021-09-13 RX ORDER — ERENUMAB-AOOE 70 MG/ML
70 INJECTION SUBCUTANEOUS
Qty: 1 ML | Refills: 5 | Status: SHIPPED
Start: 2021-09-13 | End: 2021-09-13 | Stop reason: SDUPTHER

## 2021-09-13 NOTE — PROGRESS NOTES
Stephan Bustamante is a 46 y.o. right handed male     Long history of migraines   He was following regularly for his 3-4 migraines per month   Typically took Imitrex SC for these spells    [facility] also previously discontinued his Topamax in the past for fear this was previously causing his \"dizziness\"   (prior to his tilt table)   This was restarted because his migraine headaches were worsening and titrated last appt    Previously, he was taking 100mg -- now taking 50mg    We were hesitant to try Trokendi because of this    we started Aimovig and did well   Now taking 70mg which has been working really well for him. He did miss a few doses because he ran out of samples. He was unable to get a hold of my office    He responds better with SQ Imitrex than he did with oral abortive therapies     No new neuro complaints     Still following with cardiology -- no med changes reported     No chest pain   No SOB  No vertigo, lightheadedness or loss of consciousness  No falls, tripping or stumbling  No incontinence of bowels or bladder  No itching or bruising appreciated    ROS otherwise negative    Prior to Visit Medications    Medication Sig Taking?  Authorizing Provider   SUMAtriptan (IMITREX) 6 MG/0.5ML SOLN injection Inject 0.5 mLs into the skin once as needed for Migraine Yes Kin Kim APRN - CNS   Erenumab-aooe (AIMOVIG) 70 MG/ML SOAJ Inject 70 mg into the skin every 30 days Yes Kin Kim, APRN - CNS   famotidine (PEPCID) 20 MG tablet Take 1 tablet by mouth 2 times daily for 7 days  SYBIL Presley     Allergies as of 09/13/2021    (No Known Allergies)       Objective:     /71 (Site: Right Upper Arm, Position: Sitting, Cuff Size: Medium Adult)   Pulse 72   Temp 98.4 °F (36.9 °C)   Resp 20   Ht 5' 8\" (1.727 m)   Wt 135 lb (61.2 kg)   SpO2 97%   BMI 20.53 kg/m²   Afebrile      General appearance: alert, appears stated age and cooperative -- thin  Head: Normocephalic, without obvious abnormality, atraumatic  Neck: no adenopathy, no carotid bruit  Extremities: no clubbing, no cyanosis or edema  Pulses: 2+ and symmetric  Skin: No rashes or lesions      Grimaces to palpation of right trapezius muscle     Mental Status: Alert, oriented, thought content appropriate    Speech: clear  Language: normal    Cranial Nerves:  I: smell    II: visual acuity     II: visual fields Full   II: pupils SIVA   III,VII: ptosis    III,IV,VI: extraocular muscles  Full ROM   V: mastication Normal   V: facial light touch sensation  Normal   V,VII: corneal reflex  Present   VII: facial muscle function - upper     VII: facial muscle function - lower Normal   VIII: hearing Normal   IX: soft palate elevation  Normal   IX,X: gag reflex    XI: trapezius strength  5/5   XI: sternocleidomastoid strength 5/5   XI: neck extension strength  5/5   XII: tongue strength  Normal     Motor:  5/5 throughout  Decreased bulk throughout  Normal tone    Sensory:  LT normal  Vibration normal    Coordination:   FN, FFM and CHUCKY normal    Gait:  antalgic    DTR:   1+ throughout    Laboratory/Radiology:     Recent labs at [facility]   None to personally review at this time     Echocardiogram unremarkable   No valve issues reported     Assessment:     Migraines   Markedly approved with Aimovig     Limited medication options d/t previous medical conditions      Previously failed Topamax, Imitrex pill, Axert pill, Relpax pill, Zomig nasal spray, Onzetra nasal spray and muscle relaxants such as Baclofen      He is not a candidate for Inderal or other beta blockers d/t cardiac/BP issues    Orthostatic hypotension - resolved with ProAmatine    Being monitored by cardiology    Plan:     continue Aimovig and Imitrex prn    RTO in 6 months    Call with continued issues     LISETTE Collins - CNS  4:09 PM  9/13/2021

## 2021-09-14 ENCOUNTER — TELEPHONE (OUTPATIENT)
Dept: NEUROLOGY | Age: 51
End: 2021-09-14

## 2021-09-14 NOTE — TELEPHONE ENCOUNTER
[facility] [employer] Approval of Aimovig 70 mg - [id number] valid 9/13/2021 - 3/13/2022  Electronically signed by Darrell Hu on 9/14/21 at 2:06 PM EDT

## 2021-12-10 DIAGNOSIS — G43.709 CHRONIC MIGRAINE WITHOUT AURA WITHOUT STATUS MIGRAINOSUS, NOT INTRACTABLE: ICD-10-CM

## 2021-12-10 RX ORDER — ERENUMAB-AOOE 70 MG/ML
70 INJECTION SUBCUTANEOUS
Qty: 2 ML | Refills: 0 | COMMUNITY
Start: 2021-12-10 | End: 2022-05-31 | Stop reason: SDUPTHER

## 2022-03-03 ENCOUNTER — TELEPHONE (OUTPATIENT)
Dept: NEUROLOGY | Age: 52
End: 2022-03-03

## 2022-03-03 DIAGNOSIS — G43.709 CHRONIC MIGRAINE WITHOUT AURA WITHOUT STATUS MIGRAINOSUS, NOT INTRACTABLE: ICD-10-CM

## 2022-03-03 NOTE — TELEPHONE ENCOUNTER
Patient called in stating that he has not had his Aimovig and that his won't fill it because it needs a prior [facility]. He didn't let us know last month because he was \"too busy. \" Can we give him samples until this is figured out?

## 2022-03-07 RX ORDER — ERENUMAB-AOOE 70 MG/ML
70 INJECTION SUBCUTANEOUS
Qty: 2 PEN | Refills: 0 | COMMUNITY
Start: 2022-03-07 | End: 2022-05-31

## 2022-05-31 DIAGNOSIS — G43.709 CHRONIC MIGRAINE WITHOUT AURA WITHOUT STATUS MIGRAINOSUS, NOT INTRACTABLE: ICD-10-CM

## 2022-05-31 RX ORDER — ERENUMAB-AOOE 70 MG/ML
70 INJECTION SUBCUTANEOUS
Qty: 2 ML | Refills: 0 | COMMUNITY
Start: 2022-05-31

## 2022-05-31 NOTE — TELEPHONE ENCOUNTER
Patient called in for refill on his Aimovig 70 mg.  He has been without for a month because he had problems calling in.     exp - 5/23 Lot # [id number] - 2 boxes 70mg Aimovig

## 2022-08-26 ENCOUNTER — OFFICE VISIT (OUTPATIENT)
Dept: NEUROLOGY | Age: 52
End: 2022-08-26

## 2022-08-26 VITALS
BODY MASS INDEX: 20.53 KG/M2 | SYSTOLIC BLOOD PRESSURE: 127 MMHG | WEIGHT: 135 LBS | OXYGEN SATURATION: 98 % | DIASTOLIC BLOOD PRESSURE: 76 MMHG | HEART RATE: 86 BPM | TEMPERATURE: 97.6 F

## 2022-08-26 DIAGNOSIS — G43.009 MIGRAINE WITHOUT AURA AND WITHOUT STATUS MIGRAINOSUS, NOT INTRACTABLE: Primary | ICD-10-CM

## 2022-08-26 DIAGNOSIS — G43.709 CHRONIC MIGRAINE WITHOUT AURA WITHOUT STATUS MIGRAINOSUS, NOT INTRACTABLE: ICD-10-CM

## 2022-08-26 PROCEDURE — 99214 OFFICE O/P EST MOD 30 MIN: CPT | Performed by: CLINICAL NURSE SPECIALIST

## 2022-08-26 RX ORDER — SUMATRIPTAN 6 MG/.5ML
6 INJECTION, SOLUTION SUBCUTANEOUS
Qty: 3 EACH | Refills: 3 | Status: CANCELLED | OUTPATIENT
Start: 2022-08-26 | End: 2022-08-26

## 2022-08-26 RX ORDER — SUMATRIPTAN 6 MG/.5ML
6 INJECTION, SOLUTION SUBCUTANEOUS
Qty: 3 EACH | Refills: 3 | Status: SHIPPED | OUTPATIENT
Start: 2022-08-26 | End: 2022-08-26

## 2022-08-26 RX ORDER — ERENUMAB-AOOE 70 MG/ML
70 INJECTION SUBCUTANEOUS
Qty: 2 ML | Refills: 0 | Status: CANCELLED | OUTPATIENT
Start: 2022-08-26

## 2022-08-26 NOTE — PROGRESS NOTES
Vicky Bolivar is a 45 y.o. right handed male     Long history of migraines   He was following regularly for his 3-4 migraines per month   Typically took Imitrex SC for these spells    [facility] also previously discontinued his Topamax in the past for fear this was previously causing his \"dizziness\"   (prior to his tilt table)   This was restarted because his migraine headaches were worsening and titrated last appt    Previously, he was taking 100mg -- now taking 50mg    We were hesitant to try Trokendi because of this    we started Aimovig instead and responded quite well   Now taking 70mg which has been working really well for him.   He did miss a few doses because he ran out of samples    He responds better with SQ Imitrex than he did with oral abortive therapies    Thankfully because of his response to [address] he has not needed any Imitrex injections over the last 5 to 6 months    No new neuro complaints     No chest pain   No SOB  No vertigo, lightheadedness or loss of consciousness  No falls, tripping or stumbling  No incontinence of bowels or bladder  No itching or bruising appreciated    ROS otherwise negative    Allergies as of 08/26/2022    (No Known Allergies)     Objective:     /76 (Site: Right Upper Arm)   Pulse 86   Temp 97.6 °F (36.4 °C)   Wt 135 lb (61.2 kg)   SpO2 98%   BMI 20.53 kg/m²   Afebrile      General appearance: alert, appears stated age and cooperative -- thin  Head: Normocephalic, without obvious abnormality, atraumatic  Neck: no adenopathy, no carotid bruit  Extremities: Dupuytren's contractures both hands  Pulses: 2+ and symmetric  Skin: No rashes or lesions      Grimaces to palpation of right trapezius muscle     Mental Status: Alert, oriented, thought content appropriate    Speech: clear  Language: normal    Cranial Nerves:  I: smell    II: visual acuity     II: visual fields Full   II: pupils SIVA   III,VII: ptosis    III,IV,VI: extraocular muscles  Full ROM   V: mastication Normal   V: facial light touch sensation  Normal   V,VII: corneal reflex  Present   VII: facial muscle function - upper     VII: facial muscle function - lower Normal   VIII: hearing Normal   IX: soft palate elevation  Normal   IX,X: gag reflex    XI: trapezius strength  5/5   XI: sternocleidomastoid strength 5/5   XI: neck extension strength  5/5   XII: tongue strength  Normal     Motor:  5/5 throughout  Decreased bulk throughout  Normal tone    Sensory:  LT normal  Vibration normal    Coordination:   FN, FFM and CHUCKY normal    DTR:   1+ throughout    Laboratory/Radiology:     Recent labs at [facility]    None to personally review at this time     Echocardiogram unremarkable   No valve issues reported     Assessment:     Migraines   Markedly approved with Aimovig 70 mg monthly    Limited medication options d/t previous medical conditions      Previously failed Topamax, Imitrex pill, Axert pill, Relpax pill, Zomig nasal spray, Onzetra nasal spray and muscle relaxants such as Baclofen      He is not a candidate for Inderal or other beta blockers d/t cardiac/BP issues    Orthostatic hypotension - resolved with ProAmatine    Being monitored by cardiology    Plan:     continue Aimovig 70 mg and Imitrex subcu injections prn    RTO in 6 months    Call with continued issues     LISETTE Brooks - CNS  3:11 PM  8/26/2022

## 2022-11-18 ENCOUNTER — TELEPHONE (OUTPATIENT)
Dept: NEUROLOGY | Age: 52
End: 2022-11-18

## 2022-11-18 DIAGNOSIS — G43.009 MIGRAINE WITHOUT AURA AND WITHOUT STATUS MIGRAINOSUS, NOT INTRACTABLE: Primary | ICD-10-CM

## 2022-11-18 RX ORDER — CEFUROXIME AXETIL 250 MG/1
0.5 TABLET ORAL PRN
Qty: 3 EACH | Refills: 3 | Status: SHIPPED | OUTPATIENT
Start: 2022-11-18

## 2022-11-18 NOTE — TELEPHONE ENCOUNTER
Patient came in asking for Imitrex injection but that it be SOAJ  instead of injection so he does not have to draw it up.

## 2023-01-19 ENCOUNTER — TELEPHONE (OUTPATIENT)
Dept: NEUROLOGY | Age: 53
End: 2023-01-19

## 2023-01-20 ENCOUNTER — TELEPHONE (OUTPATIENT)
Dept: NEUROLOGY | Age: 53
End: 2023-01-20

## 2023-01-20 DIAGNOSIS — G43.709 CHRONIC MIGRAINE WITHOUT AURA WITHOUT STATUS MIGRAINOSUS, NOT INTRACTABLE: ICD-10-CM

## 2023-01-20 NOTE — TELEPHONE ENCOUNTER
Patient would like to know what you want him to do regarding the Aimovig samples, can he ask for more than one at a time?   He is going to be starting a new job this coming Monday and will be working until 4pm every day so coming in to get samples every month is going to be difficult.

## 2023-01-24 RX ORDER — ERENUMAB-AOOE 70 MG/ML
70 INJECTION SUBCUTANEOUS
Qty: 3 ML | Refills: 0 | COMMUNITY
Start: 2023-01-24

## 2023-04-25 ENCOUNTER — TELEPHONE (OUTPATIENT)
Dept: NEUROLOGY | Age: 53
End: 2023-04-25

## 2023-04-25 DIAGNOSIS — G43.709 CHRONIC MIGRAINE WITHOUT AURA WITHOUT STATUS MIGRAINOSUS, NOT INTRACTABLE: ICD-10-CM

## 2023-04-25 RX ORDER — ERENUMAB-AOOE 70 MG/ML
70 INJECTION SUBCUTANEOUS
Qty: 2 ML | Refills: 0 | COMMUNITY
Start: 2023-04-25

## 2023-07-24 ENCOUNTER — OFFICE VISIT (OUTPATIENT)
Dept: NEUROLOGY | Age: 53
End: 2023-07-24

## 2023-07-24 VITALS
DIASTOLIC BLOOD PRESSURE: 80 MMHG | WEIGHT: 141 LBS | HEART RATE: 68 BPM | TEMPERATURE: 98.3 F | BODY MASS INDEX: 21.44 KG/M2 | OXYGEN SATURATION: 99 % | SYSTOLIC BLOOD PRESSURE: 119 MMHG

## 2023-07-24 DIAGNOSIS — G43.009 MIGRAINE WITHOUT AURA AND WITHOUT STATUS MIGRAINOSUS, NOT INTRACTABLE: Primary | ICD-10-CM

## 2023-07-24 PROCEDURE — 99213 OFFICE O/P EST LOW 20 MIN: CPT | Performed by: CLINICAL NURSE SPECIALIST

## 2023-07-24 RX ORDER — ATORVASTATIN CALCIUM 20 MG/1
20 TABLET, FILM COATED ORAL DAILY
COMMUNITY
Start: 2023-05-13

## 2023-07-24 NOTE — PROGRESS NOTES
Motor:  5/5 throughout  Decreased bulk throughout  Normal tone    Sensory:  LT normal  Vibration normal    Coordination:   FN, FFM and CHUCKY normal    DTR:   1+ throughout    Laboratory/Radiology:     Recent labs at [facility]    None to personally review at this time     Echocardiogram unremarkable   No valve issues reported     Assessment:     Migraines   Markedly approved with Aimovig 70 mg monthly    Limited medication options d/t previous medical conditions     Previously failed Topamax, Imitrex pill, Axert pill, Relpax pill, Zomig nasal spray, Onzetra nasal spray and muscle relaxants such as Baclofen      He is not a candidate for Inderal or other beta blockers d/t cardiac/BP issues    Orthostatic hypotension - resolved with ProAmatine    Being monitored by cardiology    Plan:     continue Mary Beth Mendoza -but because his insurance refuses to cover and her lack of availability of 70 mg, we will titrate him to 140 mg at this time    RTO in 6 months    Call with continued issues -notify us if she should change insurances and will try authorization    LISETTE Bai - CNS  8:08 AM  7/24/2023

## 2023-11-01 DIAGNOSIS — G43.709 CHRONIC MIGRAINE WITHOUT AURA WITHOUT STATUS MIGRAINOSUS, NOT INTRACTABLE: ICD-10-CM

## 2023-11-01 RX ORDER — ERENUMAB-AOOE 70 MG/ML
70 INJECTION SUBCUTANEOUS
Qty: 2 ML | Refills: 0 | Status: SHIPPED | OUTPATIENT
Start: 2023-11-01

## 2023-11-14 ENCOUNTER — TELEPHONE (OUTPATIENT)
Dept: NEUROLOGY | Age: 53
End: 2023-11-14

## 2023-11-14 DIAGNOSIS — G43.709 CHRONIC MIGRAINE WITHOUT AURA WITHOUT STATUS MIGRAINOSUS, NOT INTRACTABLE: ICD-10-CM

## 2023-11-17 RX ORDER — ERENUMAB-AOOE 70 MG/ML
70 INJECTION SUBCUTANEOUS
Qty: 2 ML | Refills: 0 | Status: SHIPPED | OUTPATIENT
Start: 2023-11-17

## 2024-01-29 ENCOUNTER — TELEPHONE (OUTPATIENT)
Dept: NEUROLOGY | Age: 54
End: 2024-01-29

## 2024-01-29 DIAGNOSIS — G43.009 MIGRAINE WITHOUT AURA AND WITHOUT STATUS MIGRAINOSUS, NOT INTRACTABLE: ICD-10-CM

## 2024-01-30 RX ORDER — CEFUROXIME AXETIL 250 MG/1
6 TABLET ORAL PRN
Qty: 3 EACH | Refills: 3 | Status: SHIPPED | OUTPATIENT
Start: 2024-01-30

## (undated) DEVICE — PADDING CAST W6INXL4YD COT LO LINTING WYTEX

## (undated) DEVICE — DOUBLE BASIN SET: Brand: MEDLINE INDUSTRIES, INC.

## (undated) DEVICE — TUBING, SUCTION, 9/32" X 10', STRAIGHT: Brand: MEDLINE

## (undated) DEVICE — GAUZE,SPONGE,4"X4",8PLY,STRL,LF,10/TRAY: Brand: MEDLINE

## (undated) DEVICE — MARKER,SKIN,WI/RULER AND LABELS: Brand: MEDLINE

## (undated) DEVICE — COVER HNDL LT DISP

## (undated) DEVICE — TUBING PMP L16FT MAIN DISP FOR AR-6400 AR-6475

## (undated) DEVICE — INTENDED FOR TISSUE SEPARATION, AND OTHER PROCEDURES THAT REQUIRE A SHARP SURGICAL BLADE TO PUNCTURE OR CUT.: Brand: BARD-PARKER ® STAINLESS STEEL BLADES

## (undated) DEVICE — MAT SURG W36XL56IN GRN FOAM ANTIFATIGUE NONSLIP DRISAFE

## (undated) DEVICE — DRESSING,GAUZE,XEROFORM,CURAD,1"X8",ST: Brand: CURAD

## (undated) DEVICE — ZIMMER® STERILE DISPOSABLE TOURNIQUET CUFF WITH PLC, DUAL PORT, SINGLE BLADDER, 30 IN. (76 CM)

## (undated) DEVICE — COUNTER NDL 30 COUNT DBL MAG

## (undated) DEVICE — Device

## (undated) DEVICE — NEEDLE SPNL L3.5IN PNK HUB S STL REG WALL FIT STYL W/ QNCKE

## (undated) DEVICE — READY WET SKIN SCRUB TRAY-LF: Brand: MEDLINE INDUSTRIES, INC.

## (undated) DEVICE — DRAPE,TOP,102X53,STERILE: Brand: MEDLINE

## (undated) DEVICE — PAD POS ARTHSCP DISP PIVOTPOST

## (undated) DEVICE — NEEDLE HYPO 22GA L1.5IN BLK POLYPR HUB S STL REG BVL STR

## (undated) DEVICE — SYRINGE 20ML LL S/C 50

## (undated) DEVICE — DRAPE,REIN 53X77,STERILE: Brand: MEDLINE

## (undated) DEVICE — BNDG,ELSTC,MATRIX,STRL,6"X5YD,LF,HOOK&LP: Brand: MEDLINE

## (undated) DEVICE — GOWN,SIRUS,FABRNF,XL,20/CS: Brand: MEDLINE

## (undated) DEVICE — ELECTRODE PT RET AD L9FT HI MOIST COND ADH HYDRGEL CORDED

## (undated) DEVICE — CHLORAPREP 26ML ORANGE

## (undated) DEVICE — TOTAL KNEE PK

## (undated) DEVICE — TOWEL,OR,DSP,ST,BLUE,STD,6/PK,12PK/CS: Brand: MEDLINE

## (undated) DEVICE — SPONGE LAP W18XL18IN WHT COT 4 PLY FLD STRUNG RADPQ DISP ST

## (undated) DEVICE — 3M™ STERI-DRAPE™ U-DRAPE 1015: Brand: STERI-DRAPE™

## (undated) DEVICE — COVER,LIGHT HANDLE,FLX,2/PK: Brand: MEDLINE INDUSTRIES, INC.

## (undated) DEVICE — BLADE SAW RESECTOR CUT 4MM

## (undated) DEVICE — 4-PORT MANIFOLD: Brand: NEPTUNE 2

## (undated) DEVICE — SOLUTION IV IRRIG LACTATED RINGERS 3000ML 2B7487

## (undated) DEVICE — GOWN,SIRUS,POLYRNF,BRTHSLV,LG,30/CS: Brand: MEDLINE